# Patient Record
Sex: MALE | Race: WHITE | Employment: FULL TIME | URBAN - METROPOLITAN AREA
[De-identification: names, ages, dates, MRNs, and addresses within clinical notes are randomized per-mention and may not be internally consistent; named-entity substitution may affect disease eponyms.]

---

## 2018-01-08 ENCOUNTER — HOSPITAL ENCOUNTER (OUTPATIENT)
Dept: CT IMAGING | Age: 63
Discharge: HOME | End: 2018-01-08
Attending: INTERNAL MEDICINE | Admitting: INTERNAL MEDICINE

## 2018-01-08 VITALS
SYSTOLIC BLOOD PRESSURE: 110 MMHG | RESPIRATION RATE: 18 BRPM | DIASTOLIC BLOOD PRESSURE: 70 MMHG | OXYGEN SATURATION: 97 % | HEART RATE: 61 BPM

## 2018-01-08 DIAGNOSIS — R93.1 ABNORMAL SCREENING CARDIAC CT: ICD-10-CM

## 2018-01-08 DIAGNOSIS — E78.00 PURE HYPERCHOLESTEROLEMIA: ICD-10-CM

## 2018-01-08 NOTE — PROGRESS NOTES
Patient arrived alert and orientated x 4, ambulatory, denies pain. Initial HR   73, SR, 119/83 , 97% on RA. CTA Coronary study done without complication. Patient given IV Metoprolol 15 mg in three doses over 15 minutes. See flow sheets for VS.  Patient was taken to [facility] after study and recovered for 30 minutes.

## 2020-09-02 ENCOUNTER — OFFICE VISIT (OUTPATIENT)
Dept: ORTHOPEDIC SURGERY | Age: 65
End: 2020-09-02
Payer: COMMERCIAL

## 2020-09-02 VITALS — BODY MASS INDEX: 25.06 KG/M2 | WEIGHT: 185 LBS | TEMPERATURE: 97 F | HEIGHT: 72 IN

## 2020-09-02 PROCEDURE — 20610 DRAIN/INJ JOINT/BURSA W/O US: CPT | Performed by: ORTHOPAEDIC SURGERY

## 2020-09-02 PROCEDURE — 99213 OFFICE O/P EST LOW 20 MIN: CPT | Performed by: ORTHOPAEDIC SURGERY

## 2020-09-02 RX ORDER — ROPIVACAINE HYDROCHLORIDE 5 MG/ML
30 INJECTION, SOLUTION EPIDURAL; INFILTRATION; PERINEURAL ONCE
Status: COMPLETED | OUTPATIENT
Start: 2020-09-02 | End: 2020-09-02

## 2020-09-02 RX ORDER — METHYLPREDNISOLONE ACETATE 40 MG/ML
40 INJECTION, SUSPENSION INTRA-ARTICULAR; INTRALESIONAL; INTRAMUSCULAR; SOFT TISSUE ONCE
Status: COMPLETED | OUTPATIENT
Start: 2020-09-02 | End: 2020-09-02

## 2020-09-02 RX ADMIN — ROPIVACAINE HYDROCHLORIDE 30 ML: 5 INJECTION, SOLUTION EPIDURAL; INFILTRATION; PERINEURAL at 12:08

## 2020-09-02 RX ADMIN — METHYLPREDNISOLONE ACETATE 40 MG: 40 INJECTION, SUSPENSION INTRA-ARTICULAR; INTRALESIONAL; INTRAMUSCULAR; SOFT TISSUE at 12:08

## 2020-09-02 NOTE — PROGRESS NOTES
[address]  Office Visit  New Patient  Date:  2020    Name:  Tin Head  Address:  [address] [place] [address] [place] [address]    :  1955      Age:   63 y.o.    SSN:  [id number]      Medical Record Number:  <[id number]>    Chief Complaint:    Right shoulder pain    HPI:   Tin Head is a 63 y.o. male who presents for evaluation of right shoulder pain. Patient states he began having right shoulder pain roughly 3 weeks ago. Denies any injury prior to this. States he has been taking Advil for pain relief with minimal improvement. Feels pain is worse with any overhead activity and during his backswing when he attempts to swing a golf club. No radiation of pain down the arm or into the neck, no numbness or tingling. Locates majority the pain to the anterior lateral region. States he has had some difficulty sleeping. He denies any new numbness, tingling, fevers, chills, chest pain, shortness of breath, or any other new significant symptoms. Pain Assessment  Location of Pain: Shoulder  Location Modifiers: Right  Severity of Pain: 7  Quality of Pain: Throbbing, Aching, Grinding  Duration of Pain: Persistent  Frequency of Pain: Constant  Date Pain First Started: 20  Aggravating Factors: Bending, Stretching, Straightening  Limiting Behavior: Yes  Relieving Factors: Rest, Nsaids(ibuprofen)  Result of Injury: No  Work-Related Injury: No  Are there other pain locations you wish to document?: No    Past History:  No past medical history on file. No past surgical history on file. Social History     Tobacco Use    Smoking status: Never Smoker    Smokeless tobacco: Never Used   Substance Use Topics    Alcohol use: Not on file    Drug use: Not on file        Family History:  family history is not on file.          Current Outpatient Medications:     Multiple Vitamins-Minerals (CENTRUM SILVER) TABS, Take 1 tablet by mouth, Disp: , Rfl:     VASCEPA 1 g CAPS capsule, TAKE 2 CAPSULES BY MOUTH TWICE A DAY, Disp: , Rfl: 9      No Known Allergies      Review of Systems: A 14 point review of systems available in the scanned medical record as documented by the patient on 9/2/2020. Today's review pertinent items are noted in HPI. No notes on file    Physical Exam:  Temp 97 °F (36.1 °C)   Ht 6' (1.829 m)   Wt 185 lb (83.9 kg)   BMI 25.09 kg/m²         General: No acute distress, well nourished  CV: No obvious peripheral edema. Normal peripheral pulses  Neuro: Alert & oriented x 3  Psych: Normal mood and affect    Right shoulder exam    Patient has full forward flexion, abduction of the right shoulder compared with the left however does have pain at the endpoints. 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 male with:  1. Right rotator cuff tendinitis  2. Right biceps tendinitis    Impression:  Encounter Diagnoses   Name Primary?     Rotator cuff tendonitis, right     Biceps tendinitis of right shoulder     Right shoulder pain, unspecified chronicity Yes       Office Procedures:  Orders Placed This Encounter   Procedures    XR SHOULDER RIGHT (MIN 2 VIEWS)     Standing Status:   Future     Number of Occurrences:   1     Standing Expiration Date:   9/2/2021     Order Specific Question:   Reason for exam:     Answer:   Pain    [facility] PT - [facility]     Referral Priority:   Routine     Referral Type:   Eval and was physically present and performed my own examination of this patient and have discussed the case, including pertinent history and exam findings with the fellow. I agree with the documented assessment and plan. Juanpablo Bro MD

## 2020-09-09 ENCOUNTER — HOSPITAL ENCOUNTER (OUTPATIENT)
Dept: PHYSICAL THERAPY | Age: 65
Setting detail: THERAPIES SERIES
Discharge: HOME | End: 2020-09-09
Payer: COMMERCIAL

## 2020-09-09 PROCEDURE — 97110 THERAPEUTIC EXERCISES: CPT | Performed by: PHYSICAL THERAPIST

## 2020-09-09 PROCEDURE — 97161 PT EVAL LOW COMPLEX 20 MIN: CPT | Performed by: PHYSICAL THERAPIST

## 2020-09-09 NOTE — PLAN OF CARE
The [address] [place] [address] and [facility]      Physical Therapy Certification    Dear Referring Practitioner: Melissa Rojas MD,    We had the pleasure of evaluating the following patient for physical therapy services at [address] [place] [address]. A summary of our findings can be found in the initial assessment below. This includes our plan of care. If you have any questions or concerns regarding these findings, please do not hesitate to contact me at the office phone number checked above. Thank you for the referral.       Physician Signature:_______________________________Date:__________________  By signing above (or electronic signature), therapists plan is approved by physician      Patient: Dorine Burkitt   : 1955   MRN: [id number]  Referring Physician: Referring Practitioner: Melissa Rojas MD      Evaluation Date: 2020      Medical Diagnosis Information:  Diagnosis: Right shoulder RTC tendinitis/Bicep tendinitis   ICD10: M75.81   Treatment Diagnosis: increased pain; decreased strength                                         Insurance information: PT Insurance Information: [facility]    Precautions/ Contra-indications:     C-SSRS Triggered by Intake questionnaire (Past 2 wk assessment):   [x] No, Questionnaire did not trigger screening.   [] Yes, Patient intake triggered further evaluation      [] C-SSRS Screening completed  [] PCP notified via Plan of Care  [] Emergency services notified     Latex Allergy:  [x]NO      []YES  Preferred Language for Healthcare:   [x]English       []other:    SUBJECTIVE: Patient stated complaint:  Pt has had pain for several weeks. He is an avid golfer. Pt reports that the cortisone injection was helpful. He has pain at 90 degrees abduction. Pain was constant prior to the injection. Pt states he has joint noise, but it is not painful.  He takes Advil prn.     - night pain  - stiffness  - sensation changes  - meds  - ice/heat    Relevant Medical History: none  Functional Disability Index:  UEFI= 91%    Pain Scale: 5/10  Easing factors:   Provocative factors: reaching, lifting, OH movements    Type: [x]Constant   [x]Intermittent  []Radiating []Localized []other:     Numbness/Tingling: negative    Occupation/School: employed full duty    Living Status/Prior Level of Function: Independent with ADLs and IADLs    OBJECTIVE:     ROM PROM AROM  Comment    L R L R    Flexion        Abduction        ER  91      IR  61      Other  (cervical)        Other             Strength L R Comment   Flexion      Abduction      ER  4-/5 pain   IR  5/5    Supraspinatus      Upper Trap      Lower Trap      Mid Trap      Rhomboids      Biceps      Triceps      Horizontal Abduction      Horizontal Adduction      Lats        Special Tests Results/Comment   Margo    Neaki    Speeds    OBriens    Apprehension     Load & Shift         Reflexes/Sensation:               [x]Dermatomes/Myotomes intact               []Reflexes equal and normal bilaterally               []Other:     Joint mobility:               [x]Normal               []Hypo              []Hyper     Palpation: TTP at the bicep groove     Functional Mobility/Transfers: WNL     Posture: WNL     Bandages/Dressings/Incisions: NA     Gait: WNL     Orthopedic Special Tests: see above objective information                       [x] Patient history, allergies, meds reviewed. Medical chart reviewed. See intake form. Review Of Systems (ROS):  [x]Performed Review of systems (Integumentary, CardioPulmonary, Neurological) by intake and observation. Intake form has been scanned into medical record. Patient has been instructed to contact their primary care physician regarding ROS issues if not already being addressed at this time.        Co-morbidities/Complexities (which will affect course of rehabilitation):   [x]None              Arthritic conditions   []Rheumatoid arthritis (M05.9)  []Osteoarthritis (M19.91)    Cardiovascular conditions   []Hypertension (I10)  []Hyperlipidemia (E78.5)  []Angina pectoris (I20)  []Atherosclerosis (I70)    Musculoskeletal conditions   []Disc pathology   []Congenital spine pathologies   []Prior surgical intervention  []Osteoporosis (M81.8)  []Osteopenia (M85.8)   Endocrine conditions   []Hypothyroid (E03.9)  []Hyperthyroid Gastrointestinal conditions   []Constipation (B63.02)    Metabolic conditions   []Morbid obesity (E66.01)  []Diabetes type 1(E10.65) or 2 (E11.65)   []Neuropathy (G60.9)      Pulmonary conditions   []Asthma (J45)  []Coughing   []COPD (J44.9)    Psychological Disorders  []Anxiety (F41.9)  []Depression (F32.9)   []Other:    []Other:            Barriers to/and or personal factors that will affect rehab potential:              []Age  []Sex              []Motivation/Lack of Motivation                        []Co-Morbidities              []Cognitive Function, education/learning barriers              []Environmental, home barriers              []profession/work barriers  []past PT/medical experience  []other:  Justification: Pt has a good rehab potential.      Falls Risk Assessment (30 days):   [x] Falls Risk assessed and no intervention required.   [] Falls Risk assessed and Patient requires intervention due to being higher risk   TUG score (>12s at risk):     [] Falls education provided, including     ASSESSMENT:   Functional Impairments              []Noted spinal or UE joint hypomobility              []Noted spinal or UE joint hypermobility              []Decreased UE functional ROM              [x]Decreased UE functional strength              []Abnormal reflexes/sensation/myotomal/dermatomal deficits              [x]Decreased RC/scapular/core strength and neuromuscular control              []other:       Functional Activity Limitations (from functional questionnaire and intake)              [x]Reduced ability to tolerate prolonged functional positions              []Reduced ability or difficulty with changes of positions or transfers between positions              []Reduced ability to maintain good posture and demonstrate good body mechanics with sitting, bending, and lifting              [] Reduced ability or tolerance with driving and/or computer work              []Reduced ability to sleep              [x]Reduced ability to perform lifting, reaching, carrying tasks              []Reduced ability to tolerate impact through UE              []Reduced ability to reach behind back              []Reduced ability to  or hold objects              []Reduced ability to throw or toss an object              []other:       Participation Restrictions              []Reduced participation in self care activities              []Reduced participation in home management activities              []Reduced participation in work activities              [x]Reduced participation in social activities. [x]Reduced participation in sport / recreational activities. Classification:              []Signs/symptoms consistent with post-surgical status including decreased ROM, strength and function.   []Signs/symptoms consistent with joint sprain/strain              []Signs/symptoms consistent with shoulder impingement              [x]Signs/symptoms consistent with shoulder/elbow/wrist tendinopathy              []Signs/symptoms consistent with Rotator cuff tear              []Signs/symptoms consistent with labral tear              []Signs/symptoms consistent with postural dysfunction                         []Signs/symptoms consistent with Glenohumeral IR Deficit - <45 degrees              []Signs/symptoms consistent with facet dysfunction of cervical/thoracic spine                         []Signs/symptoms consistent with pathology which may benefit from Dry needling                []other:      Prognosis/Rehab Potential: protection, postural re-education, activity modification, progression of HEP. GOALS:   Patient stated goal: no pain with activity. [] Progressing: [] Met: [] Not Met: [] Adjusted    Therapist goals for Patient:   Short Term Goals: To be achieved in: 2 weeks  1. Independent in HEP and progression per patient tolerance, in order to prevent re-injury. [] Progressing: [] Met: [] Not Met: [] Adjusted   2. Patient will have a decrease in pain to facilitate improvement in movement, function, and ADLs as indicated by Functional Deficits. [] Progressing: [] Met: [] Not Met: [] Adjusted    Long Term Goals: To be achieved in: 8 weeks  1. Disability index score of 5% or less for the UEFS to assist with reaching prior level of function. [] Progressing: [] Met: [] Not Met: [] Adjusted  2. Patient will demonstrate increased AROM to WNL to allow for proper joint functioning as indicated by patients Functional Deficits. [] Progressing: [] Met: [] Not Met: [] Adjusted  3. Patient will demonstrate an increase in strength to good scapular and core control  for UE to allow for proper functional mobility as indicated by patients Functional Deficits. [] Progressing: [] Met: [] Not Met: [] Adjusted  4. Patient will return to  functional activities without increased symptoms or restriction. [] Progressing: [] Met: [] Not Met: [] Adjusted  5. Pt will return to playing golf with decreased pain. [] Progressing: [] Met: [] Not Met: [] Adjusted    Electronically signed by:  Jitendra Kitchen PT    Note: If patient does not return for scheduled/ recommended follow up visits, this note will serve as a discharge from care along with most recent update on progress.

## 2020-09-09 NOTE — FLOWSHEET NOTE
The [address] [facility] [address]    Physical Therapy Daily Treatment Note  Date:  2020    Patient Name:  Negrita Weinberg    :  1955  MRN: [id number]  Restrictions/Precautions:    Medical/Treatment Diagnosis Information:  · Diagnosis: Right shoulder RTC tendinitis/Bicep tendinitis    ·  ICD10: M75.81  · Treatment Diagnosis: increased pain; decreased strength  Insurance/Certification information:  PT Insurance Information: [facility]  Physician Information:  Referring Practitioner: Jude Zambrano MD  Has the plan of care been signed (Y/N):        []  Yes  [x]  No     Date of Patient follow up with Physician:       Is this a Progress Report:     []  Yes  [x]  No        If Yes:  Date Range for reporting period:  Beginning  Ending    Progress report will be due (10 Rx or 30 days whichever is less):       Recertification will be due (POC Duration  / 90 days whichever is less): 20        Visit # Insurance Allowable Auth Required   1 20 visits hard max []  Yes []  No        Functional Scale: UEFI= 91%   Date assessed: 20     Latex Allergy:  [x]NO      []YES  Preferred Language for Healthcare:   [x]English       []other:    Pain level:  5/10     SUBJECTIVE:  See eval    OBJECTIVE: See eval   Observation:    Test measurements:      RESTRICTIONS/PRECAUTIONS:     Exercises/Interventions:   Exercises:  Exercise/Equipment Resistance/Repetitions Other comments   Stretching/PROM     Wand     Table Slides     UE [place]     Pulleys     Pendulum          Isometrics     Retraction          Weight shift     Flexion     Abduction     External Rotation     Internal Rotation     Biceps     Triceps          PRE's     Flexion     Abduction     External Rotation 2# 3x10    Internal Rotation 5# 3x10    Shrugs     EXT 2# 3x10    Reverse Flys     Serratus 0# 3x10    Horizontal Abd with ER 0# 3x10    Biceps     Triceps     Retraction 5# 3x10         Cable Column/Theraband External Rotation     Internal Rotation     Shrugs     Lats     Ext     Flex     Scapular Retraction     BIC     TRIC     PNF          Dynamic Stability          Plyoback          Manual interventions                 Patient Education:  Access Code: [email or web address]   URL: [email or web address]   Date: 09/09/2020   Prepared by: Vianney Martin     Exercises  Sidelying Shoulder External Rotation - 10 reps - 3 sets - 1x daily - 7x weekly  Supine Scapular Protraction in Flexion with Dumbbells - 10 reps - 3 sets - 1x daily - 7x weekly  Prone Shoulder Horizontal Abduction - 10 reps - 3 sets - 1x daily - 7x weekly  Prone Shoulder Extension - Single Arm - 10 reps - 3 sets - 1x daily - 7x weekly  Prone Shoulder Row - 10 reps - 3 sets - 1x daily - 7x weekly  Sidelying Shoulder Internal Rotation with Dumbbell - 10 reps - 3 sets - 1x daily - 7x weekly    Therapeutic Exercise and NMR EXR  [x] (77375) Provided verbal/tactile cueing for activities related to strengthening, flexibility, endurance, ROM  for improvements in scapular, scapulothoracic and UE control with self care, reaching, carrying, lifting, house/yardwork, driving/computer work.    [] (73527) Provided verbal/tactile cueing for activities related to improving balance, coordination, kinesthetic sense, posture, motor skill, proprioception  to assist with  scapular, scapulothoracic and UE control with self care, reaching, carrying, lifting, house/yardwork, driving/computer work. Therapeutic Activities:    [] (89528 or 11126) Provided verbal/tactile cueing for activities related to improving balance, coordination, kinesthetic sense, posture, motor skill, proprioception and motor activation to allow for proper function of scapular, scapulothoracic and UE control with self care, carrying, lifting, driving/computer work.      Home Exercise Program:    [x] (77915) Reviewed/Progressed HEP activities related to strengthening, flexibility, endurance, ROM of scapular, weeks  1. Disability index score of 5% or less for the UEFS to assist with reaching prior level of function. []? Progressing: []? Met: []? Not Met: []? Adjusted  2. Patient will demonstrate increased AROM to WNL to allow for proper joint functioning as indicated by patients Functional Deficits. []? Progressing: []? Met: []? Not Met: []? Adjusted  3. Patient will demonstrate an increase in strength to good scapular and core control  for UE to allow for proper functional mobility as indicated by patients Functional Deficits. []? Progressing: []? Met: []? Not Met: []? Adjusted  4. Patient will return to  functional activities without increased symptoms or restriction. []? Progressing: []? Met: []? Not Met: []? Adjusted  5. Pt will return to playing golf with decreased pain. []? Progressing: []? Met: []? Not Met: []? Adjusted    Overall Progression Towards Functional goals/ Treatment Progress Update:  [] Patient is progressing as expected towards functional goals listed. [] Progression is slowed due to complexities/Impairments listed. [] Progression has been slowed due to co-morbidities. [x] Plan just implemented, too soon to assess goals progression <30days   [] Goals require adjustment due to lack of progress  [] Patient is not progressing as expected and requires additional follow up with physician  [] Other    Prognosis for POC: [x] Good [] Fair  [] Poor      Patient requires continued skilled intervention: [x] Yes  [] No    Treatment/Activity Tolerance:  [] Patient able to complete treatment  [x] Patient limited by fatigue  [x] Patient limited by pain     [] Patient limited by other medical complications  [x] Other: Pt presents with increased pain and decreased RTC strength secondary to RTC tendinitis. He has good ROM. Pain with activity and golf.  Pt will benefit from a strengthening program.     PLAN: See eval  [] Continue per plan of care [] Alter current plan (see comments above)  [x] Plan of care initiated [] Hold pending MD visit [] Discharge      Electronically signed by:  Sheila Bledsoe, PT    Note: If patient does not return for scheduled/ recommended follow up visits, this note will serve as a discharge from care along with most recent update on progress.

## 2020-09-16 ENCOUNTER — HOSPITAL ENCOUNTER (OUTPATIENT)
Dept: PHYSICAL THERAPY | Age: 65
Setting detail: THERAPIES SERIES
Discharge: HOME | End: 2020-09-16
Payer: COMMERCIAL

## 2020-09-16 PROCEDURE — 97110 THERAPEUTIC EXERCISES: CPT | Performed by: PHYSICAL THERAPIST

## 2020-09-16 PROCEDURE — 97530 THERAPEUTIC ACTIVITIES: CPT | Performed by: PHYSICAL THERAPIST

## 2020-09-16 NOTE — FLOWSHEET NOTE
[facility] [address] [place] [address] [facility]    [facility] Therapy Daily Treatment Note  Date:  2020    Patient Name:  Alicia Almeida    :  1955  MRN: [id number]  Restrictions/Precautions:    Medical/Treatment Diagnosis Information:  · Diagnosis: Right shoulder RTC tendinitis/Bicep tendinitis    ·  ICD10: M75.81  · Treatment Diagnosis: increased pain; decreased strength  Insurance/Certification information:  PT Insurance Information: [employer]  Physician Information:  Referring Practitioner: Charley Burleson MD  Has the plan of care been signed (Y/N):        []  Yes  [x]  No     Date of Patient follow up with Physician:       Is this a Progress Report:     []  Yes  [x]  No        If Yes:  Date Range for reporting period:  Beginning  Ending    Progress report will be due (10 Rx or 30 days whichever is less):       Recertification will be due (POC Duration  / 90 days whichever is less): 20        Visit # Insurance Allowable Auth Required   2 20 visits hard max []  Yes []  No        Functional Scale: UEFI= 91%   Date assessed: 20     Latex Allergy:  [x]NO      []YES  Preferred Language for Healthcare:   [x]English       []other:    Pain level:  3/10; 7/10 with activity     SUBJECTIVE:  +HEP. Pt has pain with abduction at 90 degrees. He is feeling better overall. Pt is able to golf and work in the garden. Pt notes joint crepitus with rotation motions.      OBJECTIVE:    Observation:    Test measurements:      RESTRICTIONS/PRECAUTIONS:     Exercises/Interventions: Right shoulder  Exercises:  Exercise/Equipment Resistance/Repetitions Other comments   Stretching/PROM     Wand     Table Slides     UE Troy     Pulleys     Pendulum          Isometrics     Retraction          Weight shift     Flexion     Abduction     External Rotation     Internal Rotation     Biceps     Triceps          PRE's     Flexion     Abduction     External Rotation 3# 3x10    Internal Rotation 10# 3x10    Shrugs Next visit    EXT 3# 3x10    Reverse Flys     Serratus 5# 3x10    Horizontal Abd with ER 2# 3x10    Biceps 7# 3x10    Triceps     Retraction 10# 3x10         Cable Column/Theraband     External Rotation     Internal Rotation     Shrugs     Lats     Ext     Flex     Scapular Retraction HEP bands CC next visit   BIC     TRIC     PNF          Dynamic Stability     Ball on wall 3x10 CW/CCW FWD   Plyoback          Manual interventions                 Patient Education:  Access Code: [email or web address]   URL: [employer]/   Date: 09/16/2020   Prepared by: Jitendra Manifold     Exercises  Sidelying Shoulder External Rotation - 10 reps - 3 sets - 1x daily - 7x weekly  Supine Scapular Protraction in Flexion with Dumbbells - 10 reps - 3 sets - 1x daily - 7x weekly  Prone Shoulder Horizontal Abduction - 10 reps - 3 sets - 1x daily - 7x weekly  Prone Shoulder Extension - Single Arm - 10 reps - 3 sets - 1x daily - 7x weekly  Sidelying Shoulder Internal Rotation with Dumbbell - 10 reps - 3 sets - 1x daily - 7x weekly  Standing Bent Over Single Arm Scapular Row with Table Support - 10 reps - 3 sets - 1x daily - 7x weekly  Standing Wall Ball Circles with Mini Swiss Ball - 10 reps - 3 sets - 1x daily - 7x weekly  Standing Row with Anchored Resistance - 10 reps - 3 sets - 1x daily - 7x weekly  Standing Bicep Curls Supinated with Dumbbells - 10 reps - 3 sets - 1x daily - 7x weekly    Therapeutic Exercise and NMR EXR  [x] (35145) Provided verbal/tactile cueing for activities related to strengthening, flexibility, endurance, ROM  for improvements in scapular, scapulothoracic and UE control with self care, reaching, carrying, lifting, house/yardwork, driving/computer work.    [] (30879) Provided verbal/tactile cueing for activities related to improving balance, coordination, kinesthetic sense, posture, motor skill, proprioception  to assist with  scapular, scapulothoracic and UE control with self care, reaching, carrying, lifting, house/yardwork, driving/computer work. Therapeutic Activities:    [x] (43159 or 68450) Provided verbal/tactile cueing for activities related to improving balance, coordination, kinesthetic sense, posture, motor skill, proprioception and motor activation to allow for proper function of scapular, scapulothoracic and UE control with self care, carrying, lifting, driving/computer work.      Home Exercise Program:    [x] (93994) Reviewed/Progressed HEP activities related to strengthening, flexibility, endurance, ROM of scapular, scapulothoracic and UE control with self care, reaching, carrying, lifting, house/yardwork, driving/computer work  [x] (61091) Reviewed/Progressed HEP activities related to improving balance, coordination, kinesthetic sense, posture, motor skill, proprioception of scapular, scapulothoracic and UE control with self care, reaching, carrying, lifting, house/yardwork, driving/computer work      Manual Treatments:  PROM / STM / Oscillations-Mobs:  G-I, II, III, IV (PA's, Inf., Post.)  [] (61233) Provided manual therapy to mobilize soft tissue/joints of cervical/CT, scapular GHJ and UE for the purpose of modulating pain, promoting relaxation,  increasing ROM, reducing/eliminating soft tissue swelling/inflammation/restriction, improving soft tissue extensibility and allowing for proper ROM for normal function with self care, reaching, carrying, lifting, house/yardwork, driving/computer work    Modalities:      Charges:  Timed Code Treatment Minutes: 40   Total Treatment Minutes: 40       [] EVAL (LOW) 23049 (typically 20 minutes face-to-face)  [] EVAL (MOD) 14785 (typically 30 minutes face-to-face)  [] EVAL (HIGH) 57863 (typically 45 minutes face-to-face)  [] RE-EVAL     [x] AN(30843) x 2    [] IONTO  [] NMR (56991) x     [] VASO  [] Manual (40128) x      [] Other:  [x] TA x  1    [] Mech Traction (48597)  [] ES(attended) (56465)      [] ES (un) (30147):        GOALS: Patient stated goal: no pain with activity. []? Progressing: []? Met: []? Not Met: []? Adjusted     Therapist goals for Patient:   Short Term Goals: To be achieved in: 2 weeks  1. Independent in HEP and progression per patient tolerance, in order to prevent re-injury. []? Progressing: []? Met: []? Not Met: []? Adjusted   2. Patient will have a decrease in pain to facilitate improvement in movement, function, and ADLs as indicated by Functional Deficits. []? Progressing: []? Met: []? Not Met: []? Adjusted     Long Term Goals: To be achieved in: 8 weeks  1. Disability index score of 5% or less for the UEFS to assist with reaching prior level of function. []? Progressing: []? Met: []? Not Met: []? Adjusted  2. Patient will demonstrate increased AROM to WNL to allow for proper joint functioning as indicated by patients Functional Deficits. []? Progressing: []? Met: []? Not Met: []? Adjusted  3. Patient will demonstrate an increase in strength to good scapular and core control  for UE to allow for proper functional mobility as indicated by patients Functional Deficits. []? Progressing: []? Met: []? Not Met: []? Adjusted  4. Patient will return to  functional activities without increased symptoms or restriction. []? Progressing: []? Met: []? Not Met: []? Adjusted  5. Pt will return to playing golf with decreased pain. []? Progressing: []? Met: []? Not Met: []? Adjusted    Overall Progression Towards Functional goals/ Treatment Progress Update:  [] Patient is progressing as expected towards functional goals listed. [] Progression is slowed due to complexities/Impairments listed. [] Progression has been slowed due to co-morbidities.   [x] Plan just implemented, too soon to assess goals progression <30days   [] Goals require adjustment due to lack of progress  [] Patient is not progressing as expected and requires additional follow up with physician  [] Other    Prognosis for POC: [x] Good [] Fair  [] Poor      Patient requires continued skilled intervention: [x] Yes  [] No    Treatment/Activity Tolerance:  [] Patient able to complete treatment  [x] Patient limited by fatigue  [x] Patient limited by pain     [] Patient limited by other medical complications  [x] Other: Pt has a good understanding of the exercises. He was able to increase weights today. ER exercise and prone HABDER are difficult. Pt has pain at 90 degrees of abduction during arm elevation. PLAN: Strengthening program.   [x] Continue per plan of care [] Alter current plan (see comments above)  [] Plan of care initiated [] Hold pending MD visit [] Discharge      Electronically signed by:  Ling Shepard PT    Note: If patient does not return for scheduled/ recommended follow up visits, this note will serve as a discharge from care along with most recent update on progress.

## 2020-09-22 ENCOUNTER — APPOINTMENT (OUTPATIENT)
Dept: PHYSICAL THERAPY | Age: 65
End: 2020-09-22
Payer: COMMERCIAL

## 2020-09-23 ENCOUNTER — HOSPITAL ENCOUNTER (OUTPATIENT)
Dept: PHYSICAL THERAPY | Age: 65
Setting detail: THERAPIES SERIES
Discharge: HOME | End: 2020-09-23
Payer: COMMERCIAL

## 2020-09-23 PROCEDURE — 97530 THERAPEUTIC ACTIVITIES: CPT | Performed by: PHYSICAL THERAPIST

## 2020-09-23 PROCEDURE — 97110 THERAPEUTIC EXERCISES: CPT | Performed by: PHYSICAL THERAPIST

## 2020-09-23 NOTE — FLOWSHEET NOTE
Internal Rotation 10# 3x10/15    Shrugs 10# 3x10    EXT 3# 3x10/15    Reverse Flys     Serratus  Pain and click with weight   Horizontal Abd with ER 2# 3x10/15    Biceps 10# 3x10    Triceps     Retraction 10# 3x10/15         Cable Column/Theraband     External Rotation Blue step away [id number]    Internal Rotation     Shrugs     Lats     Ext     Flex     Scapular Retraction HEP bands CC plate [id number]   BIC     TRIC     PNF          Dynamic Stability     Ball on wall 2# ball  3x10 CW/CCW FWD/ABD   Plyoback          Manual interventions                 Patient Education:  Access Code: [email or web address]   URL: [employer]/   Date: 09/23/2020   Prepared by: Julissa Ho     Exercises  Sidelying Shoulder External Rotation - 10-15 reps - 3 sets - 1x daily - 7x weekly  Prone Shoulder Horizontal Abduction - 10-15 reps - 3 sets - 1x daily - 7x weekly  Prone Shoulder Extension - Single Arm - 10-15 reps - 3 sets - 1x daily - 7x weekly  Sidelying Shoulder Internal Rotation with Dumbbell - 10-15 reps - 3 sets - 1x daily - 7x weekly  Standing Bent Over Single Arm Scapular Row with Table Support - 10-15 reps - 3 sets - 1x daily - 7x weekly  Standing Wall Ball Circles with Mini Swiss Ball - 10 reps - 3 sets - 1x daily - 7x weekly  Standing Row with Anchored Resistance - 10-15 reps - 3 sets - 1x daily - 7x weekly  Standing Bicep Curls Supinated with Dumbbells - 10 reps - 3 sets - 1x daily - 7x weekly  Standing Shoulder Shrugs with Dumbbells - 10-15 reps - 3 sets - 1x daily - 7x weekly  Shoulder External Rotation Reactive Isometrics - 10 reps - 1 sets - 10sec hold - 1x daily - 7x weekly  Standing Wall Ball Circles in Scaption with Mini Swiss Ball - 10 reps - 3 sets - 1x daily - 7x weekly    Therapeutic Exercise and NMR EXR  [x] (41896) Provided verbal/tactile cueing for activities related to strengthening, flexibility, endurance, ROM  for improvements in scapular, scapulothoracic and UE control with self care, reaching, carrying, lifting, house/yardwork, driving/computer work.    [] (80212) Provided verbal/tactile cueing for activities related to improving balance, coordination, kinesthetic sense, posture, motor skill, proprioception  to assist with  scapular, scapulothoracic and UE control with self care, reaching, carrying, lifting, house/yardwork, driving/computer work. Therapeutic Activities:    [x] (27191 or 22680) Provided verbal/tactile cueing for activities related to improving balance, coordination, kinesthetic sense, posture, motor skill, proprioception and motor activation to allow for proper function of scapular, scapulothoracic and UE control with self care, carrying, lifting, driving/computer work.      Home Exercise Program:    [x] (46313) Reviewed/Progressed HEP activities related to strengthening, flexibility, endurance, ROM of scapular, scapulothoracic and UE control with self care, reaching, carrying, lifting, house/yardwork, driving/computer work  [x] (69805) Reviewed/Progressed HEP activities related to improving balance, coordination, kinesthetic sense, posture, motor skill, proprioception of scapular, scapulothoracic and UE control with self care, reaching, carrying, lifting, house/yardwork, driving/computer work      Manual Treatments:  PROM / STM / Oscillations-Mobs:  G-I, II, III, IV (PA's, Inf., Post.)  [] (21274) Provided manual therapy to mobilize soft tissue/joints of cervical/CT, scapular GHJ and UE for the purpose of modulating pain, promoting relaxation,  increasing ROM, reducing/eliminating soft tissue swelling/inflammation/restriction, improving soft tissue extensibility and allowing for proper ROM for normal function with self care, reaching, carrying, lifting, house/yardwork, driving/computer work    Modalities:      Charges:  Timed Code Treatment Minutes: 50   Total Treatment Minutes: 50       [] EVAL (LOW) 57882 (typically 20 minutes face-to-face)  [] EVAL (MOD) 46118 (typically 30 minutes due to co-morbidities. [x] Plan just implemented, too soon to assess goals progression <30days   [] Goals require adjustment due to lack of progress  [] Patient is not progressing as expected and requires additional follow up with physician  [] Other    Prognosis for POC: [x] Good [] Fair  [] Poor      Patient requires continued skilled intervention: [x] Yes  [] No    Treatment/Activity Tolerance:  [] Patient able to complete treatment  [x] Patient limited by fatigue  [x] Patient limited by pain     [] Patient limited by other medical complications  [x] Other: Pt continues to have pain in an abducted position. We discussed increasing reps vs increasing weights and when that is an appropriate choice. Pt appears to understand this concept. Advanced ER isometric band exercise. Initiated scapular retraction with the bands today. Pt has clicking and discomfort with serratus punch exercise--hold this exercise today. PLAN: Strengthening program.   [x] Continue per plan of care [] Alter current plan (see comments above)  [] Plan of care initiated [] Hold pending MD visit [] Discharge      Electronically signed by:  Magdi Moss PT    Note: If patient does not return for scheduled/ recommended follow up visits, this note will serve as a discharge from care along with most recent update on progress.

## 2020-10-02 ENCOUNTER — OFFICE VISIT (OUTPATIENT)
Dept: ORTHOPEDIC SURGERY | Age: 65
End: 2020-10-02
Payer: COMMERCIAL

## 2020-10-02 VITALS — WEIGHT: 185 LBS | BODY MASS INDEX: 25.06 KG/M2 | HEIGHT: 72 IN | TEMPERATURE: 98.6 F

## 2020-10-02 PROCEDURE — 3017F COLORECTAL CA SCREEN DOC REV: CPT | Performed by: ORTHOPAEDIC SURGERY

## 2020-10-02 PROCEDURE — G8419 CALC BMI OUT NRM PARAM NOF/U: HCPCS | Performed by: ORTHOPAEDIC SURGERY

## 2020-10-02 PROCEDURE — 99212 OFFICE O/P EST SF 10 MIN: CPT | Performed by: ORTHOPAEDIC SURGERY

## 2020-10-02 PROCEDURE — 1036F TOBACCO NON-USER: CPT | Performed by: ORTHOPAEDIC SURGERY

## 2020-10-02 PROCEDURE — G8427 DOCREV CUR MEDS BY ELIG CLIN: HCPCS | Performed by: ORTHOPAEDIC SURGERY

## 2020-10-02 PROCEDURE — G8484 FLU IMMUNIZE NO ADMIN: HCPCS | Performed by: ORTHOPAEDIC SURGERY

## 2020-10-02 NOTE — PROGRESS NOTES
The patient returns today for follow-up of right rotator cuff tendinitis. He is been in therapy about a month he had an subacromial injection which helped quite a bit. He feels he is about 50% better. ROS: Pertinent items are noted in HPI. No notes on file    History reviewed. No pertinent past medical history. History reviewed. No pertinent surgical history. History reviewed. No pertinent family history. Social History    Socioeconomic History      Marital status:       Spouse name: None      Number of children: None      Years of education: None      Highest education level: None    Occupational History      None    Social Needs      Financial resource strain: None      Food insecurity        Worry: None        Inability: None      Transportation needs        Medical: None        Non-medical: None    Tobacco Use      Smoking status: Never Smoker      Smokeless tobacco: Never Used    Substance and Sexual Activity      Alcohol use: None      Drug use: None      Sexual activity: None    Lifestyle      Physical activity        Days per week: None        Minutes per session: None      Stress: None    Relationships      Social connections        Talks on phone: None        Gets together: None        Attends Sabian[employer] service: None        Active member of club or organization: None        Attends meetings of clubs or organizations: None        Relationship status: None      Intimate partner violence        Fear of current or ex partner: None        Emotionally abused: None        Physically abused: None        Forced sexual activity: None    Other Topics      Concerns:        None    Social History Narrative      None      Current Outpatient Medications:  Multiple Vitamins-Minerals (CENTRUM SILVER) TABS, Take 1 tablet by mouth, Disp: , Rfl:   VASCEPA 1 g CAPS capsule, TAKE 2 CAPSULES BY MOUTH TWICE A DAY, Disp: , Rfl: 9    No current facility-administered medications for this visit.        No

## 2020-11-06 ENCOUNTER — OFFICE VISIT (OUTPATIENT)
Dept: ORTHOPEDIC SURGERY | Age: 65
End: 2020-11-06
Payer: COMMERCIAL

## 2020-11-06 VITALS — BODY MASS INDEX: 25.06 KG/M2 | WEIGHT: 185 LBS | TEMPERATURE: 97.1 F | HEIGHT: 72 IN

## 2020-11-06 PROCEDURE — G8419 CALC BMI OUT NRM PARAM NOF/U: HCPCS | Performed by: ORTHOPAEDIC SURGERY

## 2020-11-06 PROCEDURE — 1036F TOBACCO NON-USER: CPT | Performed by: ORTHOPAEDIC SURGERY

## 2020-11-06 PROCEDURE — G8427 DOCREV CUR MEDS BY ELIG CLIN: HCPCS | Performed by: ORTHOPAEDIC SURGERY

## 2020-11-06 PROCEDURE — G8484 FLU IMMUNIZE NO ADMIN: HCPCS | Performed by: ORTHOPAEDIC SURGERY

## 2020-11-06 PROCEDURE — 3017F COLORECTAL CA SCREEN DOC REV: CPT | Performed by: ORTHOPAEDIC SURGERY

## 2020-11-06 PROCEDURE — 20610 DRAIN/INJ JOINT/BURSA W/O US: CPT | Performed by: ORTHOPAEDIC SURGERY

## 2020-11-06 PROCEDURE — 99213 OFFICE O/P EST LOW 20 MIN: CPT | Performed by: ORTHOPAEDIC SURGERY

## 2020-11-06 RX ORDER — METHYLPREDNISOLONE ACETATE 40 MG/ML
40 INJECTION, SUSPENSION INTRA-ARTICULAR; INTRALESIONAL; INTRAMUSCULAR; SOFT TISSUE ONCE
Status: COMPLETED | OUTPATIENT
Start: 2020-11-06 | End: 2020-11-06

## 2020-11-06 RX ORDER — ROPIVACAINE HYDROCHLORIDE 5 MG/ML
30 INJECTION, SOLUTION EPIDURAL; INFILTRATION; PERINEURAL ONCE
Status: COMPLETED | OUTPATIENT
Start: 2020-11-06 | End: 2020-11-06

## 2020-11-06 RX ADMIN — ROPIVACAINE HYDROCHLORIDE 30 ML: 5 INJECTION, SOLUTION EPIDURAL; INFILTRATION; PERINEURAL at 12:01

## 2020-11-06 RX ADMIN — METHYLPREDNISOLONE ACETATE 40 MG: 40 INJECTION, SUSPENSION INTRA-ARTICULAR; INTRALESIONAL; INTRAMUSCULAR; SOFT TISSUE at 12:01

## 2020-11-06 NOTE — PROGRESS NOTES
History of Present Illness:  Tano Leach is a 63 y.o. male here for follow-up of his right shoulder. Patient is now 2 months status post right cortisone injection in the subacromial space and AC joint. He notes 50% improvement in his shoulder pain with home exercises and a cortisone injection. Unfortunately, he has plateaued with his progression and continues to have occasional twinges in the shoulder and pain while sleeping at night. He is requesting another cortisone injection today. Medical History:  Patient's medications, allergies, past medical, surgical, social and family histories were reviewed and updated as appropriate. Pertinent items are noted in HPI  Review of systems reviewed from Patient History Form dated on 11/6/2020 and available in the patient's chart under the Media tab. Vital Signs:  Vitals:    11/06/20 0937   Temp: 97.1 °F (36.2 °C)         Neuro: Alert & oriented x 3,  normal,  no focal deficits noted. Normal affect. Eyes: sclera clear  Ears: Normal external ear  Mouth:  No perioral lesions  Pulm: Respirations unlabored and regular  Pulse: Regular rate and rhythm   Skin: Warm, well perfused      Constitutional: The physical examination finds the patient to be well-developed and well-nourished. The patient is alert and oriented x3 and was cooperative throughout the visit. Right shoulder exam    Inspection:  Held in a normal posture. Normal contour at the acromioclavicular joint. No swelling, ecchymosis, or erythema about the shoulder. No atrophy appreciated. No scapular winging. Palpation: Tenderness to palpation in the bicipital groove and AC joint. Range of Motion: Full passive and active ROM with some pain in terminal abduction and forward flexion. Normal scapulothoracic rhythm. Strength:  Normal supraspinatus, infraspinatus, and subscapularis muscle strength. Stability: No anterior instability. No posterior instability.     Special Tests: Impingement findings are positive. Labral findings are negative. Speed sign and Yergason signs are both negative. Crossover sign is negative. Belly press sign is negative. Lift off sign is negative. Other findings: The skin is warm dry and well perfused. 2+ radial pulse. Sensation is intact to light touch over the deltoid. Left comparison shoulder exam    Inspection:  Held in a normal posture. Normal contour at the acromioclavicular joint. No swelling, ecchymosis, or erythema about the shoulder. No atrophy appreciated. No scapular winging. Palpation:  No subacromial crepitus. No tenderness of the AC joint. No greater tuberosity tenderness. No tenderness in the bicipital groove. Range of Motion: Full passive and active ROM. Normal scapulothoracic rhythm. Strength:  Normal supraspinatus, infraspinatus, and subscapularis muscle strength. Stability: No anterior instability. No posterior instability. Special Tests: Impingement findings are negative. Labral findings are negative. Speed sign and Yergason signs are both negative. Crossover sign is negative. Belly press sign is negative. Lift off sign is negative. Other findings: The skin is warm dry and well perfused. 2+ radial pulse. Sensation is intact to light touch over the deltoid. Radiology:     No new imaging today         Assessment :  63 y.o. male with right rotator cuff tendinitis and AC joint arthritis    Impression:  No diagnosis found. Office Procedures:  No orders of the defined types were placed in this encounter. Plan: We are encouraged by Brayan's progress so far with physical therapy. Unfortunately he is only had 2 formal physical therapy visits after which he went to a home exercise based program.  He has been using mostly Thera-Band for his strengthening program.  At this point we feel he would be better served by using weights at home and slowly increasing his weight as the shoulder tolerates.   Thera-Band's do not offer the resistance necessary to build strength. Crystal Perkins has requested another injection today and we feel that this is appropriate. The right shoulder was prepped and sterilized after which 2 cc of Depo-Medrol and 3 cc of ropivacaine was used to inject the subacromial space and AC joint. The patient tolerated the procedure well. Morena Caves is in agreement with this plan. All questions were answered to patient's satisfaction and was encouraged to call with any further questions. We will see him back in 1 month    [facility], [address] [facility]     11/06/20  10:08 AM Attestation:  I was physically present and performed my own examination of this patient and have discussed the case, including pertinent history and exam findings with the fellow. I agree with the documented assessment and plan. Yasmani Glez.  Tammie Figueroa MD

## 2020-12-04 ENCOUNTER — TELEPHONE (OUTPATIENT)
Dept: ORTHOPEDIC SURGERY | Age: 65
End: 2020-12-04

## 2020-12-04 ENCOUNTER — OFFICE VISIT (OUTPATIENT)
Dept: ORTHOPEDIC SURGERY | Age: 65
End: 2020-12-04
Payer: COMMERCIAL

## 2020-12-04 VITALS — HEIGHT: 72 IN | TEMPERATURE: 98.2 F | WEIGHT: 185 LBS | BODY MASS INDEX: 25.06 KG/M2

## 2020-12-04 PROCEDURE — 3017F COLORECTAL CA SCREEN DOC REV: CPT | Performed by: ORTHOPAEDIC SURGERY

## 2020-12-04 PROCEDURE — G8419 CALC BMI OUT NRM PARAM NOF/U: HCPCS | Performed by: ORTHOPAEDIC SURGERY

## 2020-12-04 PROCEDURE — 99213 OFFICE O/P EST LOW 20 MIN: CPT | Performed by: ORTHOPAEDIC SURGERY

## 2020-12-04 PROCEDURE — G8484 FLU IMMUNIZE NO ADMIN: HCPCS | Performed by: ORTHOPAEDIC SURGERY

## 2020-12-04 PROCEDURE — 1036F TOBACCO NON-USER: CPT | Performed by: ORTHOPAEDIC SURGERY

## 2020-12-04 PROCEDURE — G8427 DOCREV CUR MEDS BY ELIG CLIN: HCPCS | Performed by: ORTHOPAEDIC SURGERY

## 2020-12-04 NOTE — TELEPHONE ENCOUNTER
General Question     Subject: He would like his MRI sent to [facility]    Patient and /or Facility Request: Patient    Contact Number: [phone]

## 2020-12-04 NOTE — PROGRESS NOTES
History of Present Illness:  Regine Gonzalez is a 63 y.o. male here for follow-up of the right shoulder. Patient has had pain in the right shoulder for the last 3 months. He has had 2 cortisone injections both split into the [facility] joint and subacromial space. He has had about 50% improvement in his pain. He is also been going to physical therapy for rotator cuff and periscapular muscle strengthening exercises and has had some improvement as well. Unfortunately he is still having quite a bit of pain especially while sleeping at night. He is unable to lift any objects of substance away from his body without aggravating his right shoulder. Medical History:  Patient's medications, allergies, past medical, surgical, social and family histories were reviewed and updated as appropriate. Pertinent items are noted in HPI  Review of systems reviewed from Patient History Form dated on 12/4/2020 and available in the patient's chart under the Media tab. Vital Signs:  Vitals:    12/04/20 1104   Temp: 98.2 °F (36.8 °C)         Neuro: Alert & oriented x 3,  normal,  no focal deficits noted. Normal affect. Eyes: sclera clear  Ears: Normal external ear  Mouth:  No perioral lesions  Pulm: Respirations unlabored and regular  Pulse: Regular rate and rhythm   Skin: Warm, well perfused      Constitutional: The physical examination finds the patient to be well-developed and well-nourished. The patient is alert and oriented x3 and was cooperative throughout the visit. Right shoulder exam    Inspection:  Held in a normal posture. Normal contour at the acromioclavicular joint. No swelling, ecchymosis, or erythema about the shoulder. No atrophy appreciated. No scapular winging. Palpation: Tenderness to palpation over the greater tuberosity, no subacromial crepitus. No tenderness of the AC joint. No tenderness in the bicipital groove. Range of Motion: Full passive and active ROM.  Normal scapulothoracic rhythm. Strength: Pain with resisted abduction and forward flexion of the shoulder. Stability: No anterior instability. No posterior instability. Special Tests: Impingement findings are negative. Labral findings are negative. Speed sign and Yergason signs are both negative. Crossover sign is negative. Belly press sign is negative. Lift off sign is negative. Other findings: The skin is warm dry and well perfused. 2+ radial pulse. Sensation is intact to light touch over the deltoid. Radiology:     No new imaging today    Assessment :  63 y.o. male with right rotator cuff tendinitis and subacromial impingement    Impression:  No diagnosis found. Office Procedures:  No orders of the defined types were placed in this encounter. Plan: We had a good discussion with David Massey today regarding his right shoulder pain. At this point he has failed conservative measures including oral anti-inflammatories, physical therapy and cortisone injections. At this point we feel beneficial to get an MRI of the right shoulder to further assess his internal derangement. We will see him back following the MRI to review the results. Darnell Galeana is in agreement with this plan. All questions were answered to patient's satisfaction and was encouraged to call with any further questions. Kirstin Peterson DO  Clinical Fellow  [address] [place] [address]     12/04/20  11:39 AM Attestation:  I was physically present and performed my own examination of this patient and have discussed the case, including pertinent history and exam findings with the fellow. I agree with the documented assessment and plan. Jessica King.  Galina Mooney MD

## 2020-12-11 ENCOUNTER — OFFICE VISIT (OUTPATIENT)
Dept: ORTHOPEDIC SURGERY | Age: 65
End: 2020-12-11
Payer: COMMERCIAL

## 2020-12-11 ENCOUNTER — TELEPHONE (OUTPATIENT)
Dept: ORTHOPEDIC SURGERY | Age: 65
End: 2020-12-11

## 2020-12-11 VITALS — WEIGHT: 185 LBS | BODY MASS INDEX: 25.06 KG/M2 | TEMPERATURE: 97.2 F | HEIGHT: 72 IN

## 2020-12-11 PROCEDURE — 3017F COLORECTAL CA SCREEN DOC REV: CPT | Performed by: ORTHOPAEDIC SURGERY

## 2020-12-11 PROCEDURE — G8427 DOCREV CUR MEDS BY ELIG CLIN: HCPCS | Performed by: ORTHOPAEDIC SURGERY

## 2020-12-11 PROCEDURE — G8419 CALC BMI OUT NRM PARAM NOF/U: HCPCS | Performed by: ORTHOPAEDIC SURGERY

## 2020-12-11 PROCEDURE — 99213 OFFICE O/P EST LOW 20 MIN: CPT | Performed by: ORTHOPAEDIC SURGERY

## 2020-12-11 PROCEDURE — G8484 FLU IMMUNIZE NO ADMIN: HCPCS | Performed by: ORTHOPAEDIC SURGERY

## 2020-12-11 PROCEDURE — 1036F TOBACCO NON-USER: CPT | Performed by: ORTHOPAEDIC SURGERY

## 2020-12-11 NOTE — TELEPHONE ENCOUNTER
Faxing Patient Information     Facility Name: [facility]  Contact Name: DR WALLACE [address] [facility] Number:   Facility Fax Number: [phone] [address] [facility] OP HISTORY AND PHYSICAL FORM TO DR RODRIGO ANGELES'S FOR PATIENT FOR [address] 12/29    PATIENT'S APPOINTMENT IS Monday 12-14-20 AT 11AM  [phone]

## 2020-12-11 NOTE — PROGRESS NOTES
Patient returns today for follow-up of his right shoulder. We feel he at least rotator cuff tendinitis and AC joint arthritis. He did not respond to injections and physical therapy so we sent him for an MRI to see if he could have a small rotator cuff tear. He returns with his MRI today. ROS: Pertinent items are noted in HPI. No notes on file    History reviewed. No pertinent past medical history. History reviewed. No pertinent surgical history. History reviewed. No pertinent family history.       Social History    Socioeconomic History      Marital status:       Spouse name: None      Number of children: None      Years of education: None      Highest education level: None    Occupational History      None    Social Needs      Financial resource strain: None      Food insecurity        Worry: None        Inability: None      Transportation needs        Medical: None        Non-medical: None    Tobacco Use      Smoking status: Never Smoker      Smokeless tobacco: Never Used    Substance and Sexual Activity      Alcohol use: None      Drug use: None      Sexual activity: None    Lifestyle      Physical activity        Days per week: None        Minutes per session: None      Stress: None    Relationships      Social connections        Talks on phone: None        Gets together: None        Attends Mu-ism service: None        Active member of club or organization: None        Attends meetings of clubs or organizations: None        Relationship status: None      Intimate partner violence        Fear of current or ex partner: None        Emotionally abused: None        Physically abused: None        Forced sexual activity: None    Other Topics      Concerns:        None    Social History Narrative      None      Current Outpatient Medications:  Multiple Vitamins-Minerals (CENTRUM SILVER) TABS, Take 1 tablet by mouth, Disp: , Rfl:   VASCEPA 1 g CAPS capsule, TAKE 2 CAPSULES BY MOUTH TWICE A DAY, Disp: , Rfl: 9    No current facility-administered medications for this visit. No Known Allergies    VITAL SIGNS:  Temp 97.2 °F (36.2 °C)   Ht 6' (1.829 m)   Wt 185 lb (83.9 kg)   BMI 25.09 kg/m²   On examination today he can get 180 degrees of active flexion and abduction but hurts above about 90 degrees. He does have some signs of biceps tendinitis as well. He has pretty good internal and external rotation strength with a negative belly press test.    His MRI however shows a about 1-1/2 x 1/2 cm supraspinatus tear. He has AC arthrosis with large inferior osteophyte indenting the rotator cuff. He has a positive bull's-eye sign and possibly some superior labral fraying. Impression: Right shoulder rotator cuff tear and AC arthrosis with possible SLAP tear. Plan: We discussed the options with the patient. He would like to proceed with a arthroscopic rotator cuff repair distal clavicle excision and subacromial decompression. We told him we would look at his biceps tendon attachment and if he has either SLAP tear or biceps tendinosis we can either do tenotomy or tenodesis. The patient would like a tenodesis done as he is thin and in shape. We will try to get him scheduled for this and see him back preoperatively. All questions were answered.

## 2020-12-18 ENCOUNTER — TELEPHONE (OUTPATIENT)
Dept: ORTHOPEDIC SURGERY | Age: 65
End: 2020-12-18

## 2020-12-18 NOTE — PROGRESS NOTES
7. You MUST make arrangements for a responsible adult to stay on site while you are here and take you home after your surgery. You will not be allowed to leave alone or drive yourself home. It is strongly suggested someone stay with you the first 24 hrs. Your surgery will be cancelled if you do not have a ride home. 8. A parent/legal guardian must accompany a child scheduled for surgery and plan to stay at the hospital until the child is discharged. Please do not bring other children with you. 9. Please wear simple, loose fitting clothing to the hospital.  Maninder Ko not bring valuables (money, credit cards, checkbooks, etc.) Do not wear any makeup (including no eye makeup) or nail polish on your fingers or toes. 10. DO NOT wear any jewelry or piercings on day of surgery. All body piercing jewelry must be removed. 11. If you have ___dentures, they will be removed before going to the OR; we will provide you a container. If you wear ___contact lenses or _X__glasses, they will be removed; please bring a case for them. 12. Please see your family doctor/pediatrician for a history & physical and/or concerning medications. Bring any test results/reports from your physician's office. PCP__________________Phone___________H&P Appt. Date________             13 If you  have a Living Will and Durable Power of  for Healthcare, please bring in a copy. 15. Notify your Surgeon if you develop any illness between now and surgery  time, cough, cold, fever, sore throat, nausea, vomiting, etc.  Please notify your surgeon if you experience dizziness, shortness of breath or blurred vision between now & the time of your surgery             15. DO NOT shave your operative site 96 hours prior to surgery. For face & neck surgery, men may use an electric razor 48 hours prior to surgery. 16. Shower the night before or morning of surgery using an antibacterial soap or as you have been instructed. 17. To provide excellent care visitors will be limited to one in the room at any given time. 18.  Please bring picture ID and insurance card. 19.  Visit our web site for additional information:  [email or web address]              20.During flu season no children under the age of 15 are permitted in the hospital for the safety of all patients. 21. If you take a long acting insulin in the evening only  take half of your usual  dose the night  before your procedure              22. If you use a c-pap please bring DOS if staying overnight,             23.For your convenience [facility] has a pharmacy on site to fill your prescriptions. 24. If you use oxygen and have a portable tank please bring it  with you the DOS             25. Bring a complete list of all your medications with name and dose include any supplements. 26. Other__________________________________________   *Please call pre admission testing if you any further questions   Reshma Nolan [address]    [facility]. [place]  [phone]   [address] [place] [address]       All above information reviewed with patient in person or by phone. Patient verbalizes understanding. All questions and concerns addressed.                                                                                                  Patient/Rep____PATIENT________________ Patient instructed to get their COVID-19 test done as directed by their doctor (5-7 days prior to procedure)  or patient states will get on __12/23/2020________. Patient was notified that they need to have an appointment,number to call provided. The day the COVID test is done is considered day one. Instructed to self quarantine after test until DOS. There is a one visitor policy at [facility] for all surgeries and endoscopies. Whether the visitor can stay or will be asked to wait in the car will depend on the current policy and if social distancing can be maintained. The policy is subject to change at any time. Please make sure the visitor has a cell phone that is on,charged and able to accept calls, as this may be the way that the staff communicates with them. Pain management is NO VISITOR policyThe patients ride is expected to remain in the car with a cell phone for communication. If the ride is leaving the hospital grounds please make sure they are back in time for pickup. Have the patient inform the staff on arrival what their rides plans are while the patient is in the facility. At the MAIN there is one visitor allowed. Please note that the visitor policy is subject to change.   PRE OP INSTRUCTIONS

## 2020-12-23 ENCOUNTER — OFFICE VISIT (OUTPATIENT)
Dept: PRIMARY CARE CLINIC | Age: 65
End: 2020-12-23
Payer: COMMERCIAL

## 2020-12-23 PROCEDURE — 99211 OFF/OP EST MAY X REQ PHY/QHP: CPT | Performed by: NURSE PRACTITIONER

## 2020-12-23 PROCEDURE — G8419 CALC BMI OUT NRM PARAM NOF/U: HCPCS | Performed by: NURSE PRACTITIONER

## 2020-12-23 PROCEDURE — G8428 CUR MEDS NOT DOCUMENT: HCPCS | Performed by: NURSE PRACTITIONER

## 2020-12-23 NOTE — PROGRESS NOTES
Dora Tejada received a viral test for COVID-19. They were educated on isolation and quarantine as appropriate. For any symptoms, they were directed to seek care from their PCP, given contact information to establish with a doctor, directed to an urgent care or the emergency room.

## 2020-12-23 NOTE — PATIENT INSTRUCTIONS
You have received a viral test for COVID-19. Below is education on quarantine per the CDC guidelines. For any symptoms, seek care from your PCP, call [phone] to establish care with a doctor, or go directly to an urgent care or the emergency room. Test results will take 2-7 days and will be sent to you in your [employer] account. If you test positive, you will be contacted via phone. If you test negative, the ONLY communication will be through [address] GO TO [employer] AND SIGN UP FOR [employer]  (LOWER LEFT OF THE HOME PAGE)  No test is 100%. If you have symptoms, you should follow the guidance of quarantine as previously stated. You can still be contagious if you have symptoms. Your [facility] will reach out to you if you have a positive result. They will provide you with a return to work date and note. If you were tested for a pre-op, then you should remain in quarantine until your procedure. How do I know if I need to be in quarantine? If you live in a community where COVID-19 is or might be spreading (currently, that is virtually everywhere in the [place])  Be alert for symptoms. Watch for fever, cough, shortness of breath, or other symptoms of COVID-19.  ? Take your temperature if symptoms develop. ? Practice social distancing. Maintain 6 feet of distance from others and stay out of crowded places. ? Follow CDC guidance if symptoms develop. If you feel healthy but:  ? Recently had close contact with a person with COVID-19 you need to Quarantine:  ? Stay home until 14 days after your last exposure. ? Check your temperature twice a day and watch for symptoms of COVID-19.  ? If possible, stay away from people who are at higher-risk for getting very sick from COVID-19. Stay Home and Monitor Your Health if you:  ? Have been diagnosed with COVID-19, or  ? Are waiting for test results, or  ?  Have cough, fever, or shortness of breath, or symptoms of COVID-19 When You Can be Around Others After You Had or Likely Had COVID-19     If you have or think you might have COVID-19, it is important to stay home and away from other people. Staying away from others helps stop the spread of COVID-19. If you have an emergency warning sign (including trouble breathing), get emergency medical care immediately. When you can be around others (end home isolation) depends on different factors for different situations. Find CDC's recommendations for your situation below. I think or know I had COVID-19, and I had symptoms  You can be with others after  ? 3 days with no fever and  ? Respiratory symptoms have improved (e.g. cough, shortness of breath) and  ? 10 days since symptoms first appeared  Depending on your healthcare provider's advice and availability of testing, you might get tested to see if you still have COVID-19. If you will be tested, you can be around others when you have no fever, respiratory symptoms have improved, and you receive two negative test results in a row, at least 24 hours apart. I tested positive for COVID-19 but had no symptoms  If you continue to have no symptoms, you can be with others after:  ? 10 days have passed since test or 14 days since your exposure test   Depending on your healthcare provider's advice and availability of testing, you might get tested to see if you still have COVID-19. If you will be tested, you can be around others after you receive two negative test results in a row, at least 24 hours apart. If you develop symptoms after testing positive, follow the guidance above for I think or know I had COVID, and I had symptoms.   For Anyone Who Has Been Around a Person with COVID-19  It is important to remember that anyone who has close contact with someone with COVID-19 should stay home for 14 days after exposure based on the time it takes to develop illness. Testing is not necessary.     www.cdc.gov/coronavirus/2019-ncov/index.html

## 2020-12-24 LAB — SARS-COV-2, NAA: NOT DETECTED

## 2020-12-28 ENCOUNTER — OFFICE VISIT (OUTPATIENT)
Dept: ORTHOPEDIC SURGERY | Age: 65
End: 2020-12-28

## 2020-12-28 VITALS — WEIGHT: 185 LBS | TEMPERATURE: 97 F | HEIGHT: 72 IN | BODY MASS INDEX: 25.06 KG/M2

## 2020-12-28 PROCEDURE — 99999 PR OFFICE/OUTPT VISIT,PROCEDURE ONLY: CPT | Performed by: ORTHOPAEDIC SURGERY

## 2020-12-28 RX ORDER — OXYCODONE HYDROCHLORIDE AND ACETAMINOPHEN 5; 325 MG/1; MG/1
1-2 TABLET ORAL EVERY 4 HOURS PRN
Qty: 40 TABLET | Refills: 0 | Status: SHIPPED | OUTPATIENT
Start: 2020-12-28 | End: 2021-01-04

## 2020-12-28 NOTE — PROGRESS NOTES
Patient returns today for preop visit for his right shoulder. He scheduled for an arthroscopic subacromial decompression distal clavicle excision rotator cuff repair and possible open biceps tenodesis. ROS: Pertinent items are noted in HPI. No notes on file    Past Medical History:  No date: Crohn disease ([address]  No date: Hyperlipidemia     Past Surgical History:  No date: COLONOSCOPY  No date: SMALL INTESTINE SURGERY    History reviewed. No pertinent family history.       Social History    Socioeconomic History      Marital status:       Spouse name: None      Number of children: None      Years of education: None      Highest education level: None    Occupational History      None    Social Needs      Financial resource strain: None      Food insecurity        Worry: None        Inability: None      Transportation needs        Medical: None        Non-medical: None    Tobacco Use      Smoking status: Never Smoker      Smokeless tobacco: Never Used    Substance and Sexual Activity      Alcohol use: Yes        Comment: 3-5/ WEEK      Drug use: Never      Sexual activity: None    Lifestyle      Physical activity        Days per week: None        Minutes per session: None      Stress: None    Relationships      Social connections        Talks on phone: None        Gets together: None        Attends Jehovah's witness service: None        Active member of club or organization: None        Attends meetings of clubs or organizations: None        Relationship status: None      Intimate partner violence        Fear of current or ex partner: None        Emotionally abused: None        Physically abused: None        Forced sexual activity: None    Other Topics      Concerns:        None    Social History Narrative      None      Current Outpatient Medications:    Multiple Vitamins-Minerals (PRESERVISION AREDS PO), Take by mouth 2 times daily, Disp: , Rfl:

## 2020-12-29 ENCOUNTER — ANESTHESIA EVENT (OUTPATIENT)
Dept: OPERATING ROOM | Age: 65
End: 2020-12-29
Payer: COMMERCIAL

## 2020-12-29 ENCOUNTER — ANESTHESIA (OUTPATIENT)
Dept: OPERATING ROOM | Age: 65
End: 2020-12-29
Payer: COMMERCIAL

## 2020-12-29 ENCOUNTER — HOSPITAL ENCOUNTER (OUTPATIENT)
Age: 65
Setting detail: OUTPATIENT SURGERY
Discharge: HOME | End: 2020-12-29
Attending: ORTHOPAEDIC SURGERY | Admitting: ORTHOPAEDIC SURGERY
Payer: COMMERCIAL

## 2020-12-29 VITALS
RESPIRATION RATE: 16 BRPM | HEIGHT: 72 IN | HEART RATE: 82 BPM | OXYGEN SATURATION: 99 % | SYSTOLIC BLOOD PRESSURE: 132 MMHG | TEMPERATURE: 97.2 F | BODY MASS INDEX: 25.47 KG/M2 | WEIGHT: 188 LBS | DIASTOLIC BLOOD PRESSURE: 84 MMHG

## 2020-12-29 VITALS
SYSTOLIC BLOOD PRESSURE: 114 MMHG | RESPIRATION RATE: 5 BRPM | OXYGEN SATURATION: 98 % | TEMPERATURE: 96.8 F | DIASTOLIC BLOOD PRESSURE: 65 MMHG

## 2020-12-29 PROCEDURE — 6360000002 HC RX W HCPCS: Performed by: ORTHOPAEDIC SURGERY

## 2020-12-29 PROCEDURE — 3600000004 HC SURGERY LEVEL 4 BASE: Performed by: ORTHOPAEDIC SURGERY

## 2020-12-29 PROCEDURE — 3700000000 HC ANESTHESIA ATTENDED CARE: Performed by: ORTHOPAEDIC SURGERY

## 2020-12-29 PROCEDURE — 7100000010 HC PHASE II RECOVERY - FIRST 15 MIN: Performed by: ORTHOPAEDIC SURGERY

## 2020-12-29 PROCEDURE — 7100000000 HC PACU RECOVERY - FIRST 15 MIN: Performed by: ORTHOPAEDIC SURGERY

## 2020-12-29 PROCEDURE — 2500000003 HC RX 250 WO HCPCS: Performed by: ORTHOPAEDIC SURGERY

## 2020-12-29 PROCEDURE — L3660 SO 8 AB RSTR CAN/WEB PRE OTS: HCPCS | Performed by: ORTHOPAEDIC SURGERY

## 2020-12-29 PROCEDURE — 7100000011 HC PHASE II RECOVERY - ADDTL 15 MIN: Performed by: ORTHOPAEDIC SURGERY

## 2020-12-29 PROCEDURE — 3700000001 HC ADD 15 MINUTES (ANESTHESIA): Performed by: ORTHOPAEDIC SURGERY

## 2020-12-29 PROCEDURE — 7100000001 HC PACU RECOVERY - ADDTL 15 MIN: Performed by: ORTHOPAEDIC SURGERY

## 2020-12-29 PROCEDURE — 3600000014 HC SURGERY LEVEL 4 ADDTL 15MIN: Performed by: ORTHOPAEDIC SURGERY

## 2020-12-29 PROCEDURE — 6360000002 HC RX W HCPCS: Performed by: ANESTHESIOLOGY

## 2020-12-29 PROCEDURE — 2580000003 HC RX 258: Performed by: ORTHOPAEDIC SURGERY

## 2020-12-29 PROCEDURE — 2709999900 HC NON-CHARGEABLE SUPPLY: Performed by: ORTHOPAEDIC SURGERY

## 2020-12-29 PROCEDURE — 6360000002 HC RX W HCPCS: Performed by: NURSE ANESTHETIST, CERTIFIED REGISTERED

## 2020-12-29 PROCEDURE — 2500000003 HC RX 250 WO HCPCS: Performed by: NURSE ANESTHETIST, CERTIFIED REGISTERED

## 2020-12-29 PROCEDURE — 64415 NJX AA&/STRD BRCH PLXS IMG: CPT | Performed by: ANESTHESIOLOGY

## 2020-12-29 PROCEDURE — 2720000010 HC SURG SUPPLY STERILE: Performed by: ORTHOPAEDIC SURGERY

## 2020-12-29 PROCEDURE — C1713 ANCHOR/SCREW BN/BN,TIS/BN: HCPCS | Performed by: ORTHOPAEDIC SURGERY

## 2020-12-29 DEVICE — ANCHOR SUTURE BIOCOMP 4.75X19.1 MM SWIVELOCK C: Type: IMPLANTABLE DEVICE | Site: SHOULDER | Status: FUNCTIONAL

## 2020-12-29 DEVICE — SCREW INTRF BIOCOMP 7X10 MM FACILITATE BIOTENODESIS: Type: IMPLANTABLE DEVICE | Site: SHOULDER | Status: FUNCTIONAL

## 2020-12-29 DEVICE — HEALICOIL RG SA 4.75MM W/2 UB-BL                                    CBRD BL
Type: IMPLANTABLE DEVICE | Site: SHOULDER | Status: FUNCTIONAL
Brand: HEALICOIL

## 2020-12-29 RX ORDER — SODIUM CHLORIDE, SODIUM LACTATE, POTASSIUM CHLORIDE, CALCIUM CHLORIDE 600; 310; 30; 20 MG/100ML; MG/100ML; MG/100ML; MG/100ML
INJECTION, SOLUTION INTRAVENOUS CONTINUOUS
Status: DISCONTINUED | OUTPATIENT
Start: 2020-12-29 | End: 2020-12-29 | Stop reason: HOSPADM

## 2020-12-29 RX ORDER — HYDROMORPHONE HCL 110MG/55ML
0.25 PATIENT CONTROLLED ANALGESIA SYRINGE INTRAVENOUS EVERY 5 MIN PRN
Status: DISCONTINUED | OUTPATIENT
Start: 2020-12-29 | End: 2020-12-29 | Stop reason: HOSPADM

## 2020-12-29 RX ORDER — SODIUM CHLORIDE 9 MG/ML
INJECTION, SOLUTION INTRAVENOUS CONTINUOUS
Status: DISCONTINUED | OUTPATIENT
Start: 2020-12-29 | End: 2020-12-29 | Stop reason: HOSPADM

## 2020-12-29 RX ORDER — FENTANYL CITRATE 50 UG/ML
50 INJECTION, SOLUTION INTRAMUSCULAR; INTRAVENOUS ONCE
Status: COMPLETED | OUTPATIENT
Start: 2020-12-29 | End: 2020-12-29

## 2020-12-29 RX ORDER — PHENYLEPHRINE HCL IN 0.9% NACL 1 MG/10 ML
SYRINGE (ML) INTRAVENOUS PRN
Status: DISCONTINUED | OUTPATIENT
Start: 2020-12-29 | End: 2020-12-29 | Stop reason: SDUPTHER

## 2020-12-29 RX ORDER — PROPOFOL 10 MG/ML
INJECTION, EMULSION INTRAVENOUS PRN
Status: DISCONTINUED | OUTPATIENT
Start: 2020-12-29 | End: 2020-12-29 | Stop reason: SDUPTHER

## 2020-12-29 RX ORDER — LIDOCAINE HYDROCHLORIDE 10 MG/ML
1 INJECTION, SOLUTION EPIDURAL; INFILTRATION; INTRACAUDAL; PERINEURAL
Status: DISCONTINUED | OUTPATIENT
Start: 2020-12-29 | End: 2020-12-29 | Stop reason: HOSPADM

## 2020-12-29 RX ORDER — HYDROMORPHONE HCL 110MG/55ML
0.5 PATIENT CONTROLLED ANALGESIA SYRINGE INTRAVENOUS EVERY 5 MIN PRN
Status: DISCONTINUED | OUTPATIENT
Start: 2020-12-29 | End: 2020-12-29 | Stop reason: HOSPADM

## 2020-12-29 RX ORDER — LIDOCAINE HYDROCHLORIDE 10 MG/ML
0.5 INJECTION, SOLUTION EPIDURAL; INFILTRATION; INTRACAUDAL; PERINEURAL ONCE
Status: DISCONTINUED | OUTPATIENT
Start: 2020-12-29 | End: 2020-12-29 | Stop reason: HOSPADM

## 2020-12-29 RX ORDER — ROPIVACAINE HYDROCHLORIDE 5 MG/ML
INJECTION, SOLUTION EPIDURAL; INFILTRATION; PERINEURAL
Status: COMPLETED | OUTPATIENT
Start: 2020-12-29 | End: 2020-12-29

## 2020-12-29 RX ORDER — LIDOCAINE HYDROCHLORIDE 20 MG/ML
INJECTION, SOLUTION EPIDURAL; INFILTRATION; INTRACAUDAL; PERINEURAL PRN
Status: DISCONTINUED | OUTPATIENT
Start: 2020-12-29 | End: 2020-12-29 | Stop reason: SDUPTHER

## 2020-12-29 RX ORDER — MIDAZOLAM HYDROCHLORIDE 2 MG/2ML
1 INJECTION, SOLUTION INTRAMUSCULAR; INTRAVENOUS ONCE
Status: COMPLETED | OUTPATIENT
Start: 2020-12-29 | End: 2020-12-29

## 2020-12-29 RX ORDER — HYDROCODONE BITARTRATE AND ACETAMINOPHEN 5; 325 MG/1; MG/1
1 TABLET ORAL
Status: DISCONTINUED | OUTPATIENT
Start: 2020-12-29 | End: 2020-12-29 | Stop reason: HOSPADM

## 2020-12-29 RX ORDER — ONDANSETRON 2 MG/ML
INJECTION INTRAMUSCULAR; INTRAVENOUS PRN
Status: DISCONTINUED | OUTPATIENT
Start: 2020-12-29 | End: 2020-12-29 | Stop reason: SDUPTHER

## 2020-12-29 RX ORDER — ONDANSETRON 2 MG/ML
4 INJECTION INTRAMUSCULAR; INTRAVENOUS
Status: DISCONTINUED | OUTPATIENT
Start: 2020-12-29 | End: 2020-12-29 | Stop reason: HOSPADM

## 2020-12-29 RX ORDER — DEXAMETHASONE SODIUM PHOSPHATE 4 MG/ML
INJECTION, SOLUTION INTRA-ARTICULAR; INTRALESIONAL; INTRAMUSCULAR; INTRAVENOUS; SOFT TISSUE PRN
Status: DISCONTINUED | OUTPATIENT
Start: 2020-12-29 | End: 2020-12-29 | Stop reason: SDUPTHER

## 2020-12-29 RX ORDER — FENTANYL CITRATE 50 UG/ML
INJECTION, SOLUTION INTRAMUSCULAR; INTRAVENOUS PRN
Status: DISCONTINUED | OUTPATIENT
Start: 2020-12-29 | End: 2020-12-29 | Stop reason: SDUPTHER

## 2020-12-29 RX ADMIN — SODIUM CHLORIDE, POTASSIUM CHLORIDE, SODIUM LACTATE AND CALCIUM CHLORIDE: 600; 310; 30; 20 INJECTION, SOLUTION INTRAVENOUS at 11:50

## 2020-12-29 RX ADMIN — DEXAMETHASONE SODIUM PHOSPHATE 10 MG: 4 INJECTION, SOLUTION INTRAMUSCULAR; INTRAVENOUS at 13:03

## 2020-12-29 RX ADMIN — FENTANYL CITRATE 50 MCG: 50 INJECTION, SOLUTION INTRAMUSCULAR; INTRAVENOUS at 12:52

## 2020-12-29 RX ADMIN — ROPIVACAINE HYDROCHLORIDE 25 ML: 5 INJECTION, SOLUTION EPIDURAL; INFILTRATION; PERINEURAL at 12:40

## 2020-12-29 RX ADMIN — TRANEXAMIC ACID 1259 MG: 1 INJECTION, SOLUTION INTRAVENOUS at 12:33

## 2020-12-29 RX ADMIN — FENTANYL CITRATE 50 MCG: 50 INJECTION, SOLUTION INTRAMUSCULAR; INTRAVENOUS at 14:20

## 2020-12-29 RX ADMIN — Medication 100 MCG: at 13:30

## 2020-12-29 RX ADMIN — Medication 100 MCG: at 13:43

## 2020-12-29 RX ADMIN — ONDANSETRON 4 MG: 2 INJECTION INTRAMUSCULAR; INTRAVENOUS at 13:03

## 2020-12-29 RX ADMIN — SODIUM CHLORIDE, POTASSIUM CHLORIDE, SODIUM LACTATE AND CALCIUM CHLORIDE: 600; 310; 30; 20 INJECTION, SOLUTION INTRAVENOUS at 14:08

## 2020-12-29 RX ADMIN — MIDAZOLAM 1 MG: 1 INJECTION INTRAMUSCULAR; INTRAVENOUS at 12:35

## 2020-12-29 RX ADMIN — FENTANYL CITRATE 50 MCG: 50 INJECTION, SOLUTION INTRAMUSCULAR; INTRAVENOUS at 12:35

## 2020-12-29 RX ADMIN — LIDOCAINE HYDROCHLORIDE 60 MG: 20 INJECTION, SOLUTION EPIDURAL; INFILTRATION; INTRACAUDAL; PERINEURAL at 12:57

## 2020-12-29 RX ADMIN — CEFAZOLIN SODIUM 2 G: 10 INJECTION, POWDER, FOR SOLUTION INTRAVENOUS at 12:48

## 2020-12-29 RX ADMIN — PROPOFOL 200 MG: 10 INJECTION, EMULSION INTRAVENOUS at 12:57

## 2020-12-29 ASSESSMENT — PULMONARY FUNCTION TESTS
PIF_VALUE: 2
PIF_VALUE: 16
PIF_VALUE: 15
PIF_VALUE: 14
PIF_VALUE: 2
PIF_VALUE: 15
PIF_VALUE: 2
PIF_VALUE: 2
PIF_VALUE: 13
PIF_VALUE: 2
PIF_VALUE: 2
PIF_VALUE: 15
PIF_VALUE: 15
PIF_VALUE: 2
PIF_VALUE: 1
PIF_VALUE: 14
PIF_VALUE: 15
PIF_VALUE: 2
PIF_VALUE: 15
PIF_VALUE: 2
PIF_VALUE: 15
PIF_VALUE: 2
PIF_VALUE: 15
PIF_VALUE: 2
PIF_VALUE: 15
PIF_VALUE: 2
PIF_VALUE: 15
PIF_VALUE: 16
PIF_VALUE: 2
PIF_VALUE: 15
PIF_VALUE: 2
PIF_VALUE: 13
PIF_VALUE: 2
PIF_VALUE: 15
PIF_VALUE: 2
PIF_VALUE: 15
PIF_VALUE: 2
PIF_VALUE: 2
PIF_VALUE: 15
PIF_VALUE: 13
PIF_VALUE: 0
PIF_VALUE: 2
PIF_VALUE: 13
PIF_VALUE: 1
PIF_VALUE: 15
PIF_VALUE: 2
PIF_VALUE: 1
PIF_VALUE: 15
PIF_VALUE: 2
PIF_VALUE: 15
PIF_VALUE: 15
PIF_VALUE: 13
PIF_VALUE: 14
PIF_VALUE: 2
PIF_VALUE: 16
PIF_VALUE: 2
PIF_VALUE: 15
PIF_VALUE: 15
PIF_VALUE: 0
PIF_VALUE: 15
PIF_VALUE: 2
PIF_VALUE: 15
PIF_VALUE: 2
PIF_VALUE: 13
PIF_VALUE: 15
PIF_VALUE: 2
PIF_VALUE: 2
PIF_VALUE: 15
PIF_VALUE: 2
PIF_VALUE: 15
PIF_VALUE: 13
PIF_VALUE: 15
PIF_VALUE: 11
PIF_VALUE: 23
PIF_VALUE: 15
PIF_VALUE: 2
PIF_VALUE: 15
PIF_VALUE: 15
PIF_VALUE: 2
PIF_VALUE: 2
PIF_VALUE: 13
PIF_VALUE: 13
PIF_VALUE: 15
PIF_VALUE: 2
PIF_VALUE: 24
PIF_VALUE: 2
PIF_VALUE: 16
PIF_VALUE: 13
PIF_VALUE: 15
PIF_VALUE: 2
PIF_VALUE: 2
PIF_VALUE: 15
PIF_VALUE: 15
PIF_VALUE: 14
PIF_VALUE: 2
PIF_VALUE: 1
PIF_VALUE: 15
PIF_VALUE: 14
PIF_VALUE: 15
PIF_VALUE: 2
PIF_VALUE: 15
PIF_VALUE: 3
PIF_VALUE: 15
PIF_VALUE: 2
PIF_VALUE: 15
PIF_VALUE: 15
PIF_VALUE: 2
PIF_VALUE: 16
PIF_VALUE: 15
PIF_VALUE: 15
PIF_VALUE: 13
PIF_VALUE: 15
PIF_VALUE: 14
PIF_VALUE: 15
PIF_VALUE: 0
PIF_VALUE: 16
PIF_VALUE: 15
PIF_VALUE: 13
PIF_VALUE: 14
PIF_VALUE: 2
PIF_VALUE: 13
PIF_VALUE: 15
PIF_VALUE: 16
PIF_VALUE: 15
PIF_VALUE: 2
PIF_VALUE: 15
PIF_VALUE: 2
PIF_VALUE: 15
PIF_VALUE: 2
PIF_VALUE: 2
PIF_VALUE: 15
PIF_VALUE: 2

## 2020-12-29 ASSESSMENT — PAIN - FUNCTIONAL ASSESSMENT
PAIN_FUNCTIONAL_ASSESSMENT: 0-10
PAIN_FUNCTIONAL_ASSESSMENT: PREVENTS OR INTERFERES SOME ACTIVE ACTIVITIES AND ADLS

## 2020-12-29 ASSESSMENT — PAIN SCALES - GENERAL: PAINLEVEL_OUTOF10: 0

## 2020-12-29 ASSESSMENT — PAIN DESCRIPTION - DESCRIPTORS: DESCRIPTORS: ACHING

## 2020-12-29 NOTE — PROGRESS NOTES
Time out done,02 on @ 2 l/min per n/c, then versed & fentanyl IV given, then Dr Javier Figueroa completed right intrascalene nerve block, patient tolerated procedure well.

## 2020-12-29 NOTE — PROGRESS NOTES
Patient awake , denies any pain at this time due to interscalene block. Taking sips of ginger ale. Patient meets criteria for transfer from Phase 1 to Phase 2. Status stable.

## 2020-12-29 NOTE — PROGRESS NOTES
Patient arrived from OR to PACU spot 6. Attached to monitoring system. Report received per CRNA and OR nurse. No problems reported intraoperatively . VSS. Patient arrived somnolent with oral airway in place. Will continue to observe closely.

## 2020-12-29 NOTE — H&P
Minutes per session: None    Stress: None   Relationships    Social connections     Talks on phone: None     Gets together: None     Attends Yazidism service: None     Active member of club or organization: None     Attends meetings of clubs or organizations: None     Relationship status: None    Intimate partner violence     Fear of current or ex partner: None     Emotionally abused: None     Physically abused: None     Forced sexual activity: None   Other Topics Concern    None   Social History Narrative    None       Family History:  History reviewed. No pertinent family history. REVIEW OF SYSTEMS:  Review of Systems   Constitutional: Negative for fever and chills. HENT: Negative for congestion and eye pain. Eyes: Negative for blurred vision and double vision. Respiratory: Negative for cough, shortness of breath and wheezing. Cardiovascular: Negative for chest pain and palpitations. Gastrointestinal: Negative for nausea. Negative for vomiting. Musculoskeletal: Positive for myalgias and joint pain right shoulder. Skin: Negative for itching and rash. Neurological: Negative for dizziness, sensory change and headaches. Psychiatric/Behavioral: Negative for depression and suicidal ideas. PHYSICAL EXAM:  Height 6' (1.829 m), weight 188 lb (85.3 kg). Gen: alert and oriented  Chest: symmetric chest excursion, non labored breathing  Heart: RRR   RUE: No ecchymoses, abrasion, deformity, or lacerations. Skin intact. TTP about the cuff footprint. Compartments soft. Ulnar/Median/AIN/PIN motor intact 5/5. C4-T1 sensation grossly intact. Radial pulse 2+ with BCR        LABS:  No results for input(s): WBC, HGB, HCT, PLT, INR, PTT, NA, K, BUN, CREATININE, GLUCOSE in the last 72 hours.     Invalid input(s): PT       A/P: 63 y.o. male R shoulder pain with rotator cuff tear  - OR for R shoulder arthroscopy with rotator cuff repair, distal clavicle excision, possible biceps tenodesis - NPO since midnight  - site marked  - abx on hold for OR  - consent in chart    Francisco Holloway DO   11:46 AM 12/29/2020

## 2020-12-29 NOTE — ANESTHESIA PRE PROCEDURE
Department of Anesthesiology  Preprocedure Note       Name:  Garrett Pearce   Age:  63 y.o.  :  1955                                          MRN:  [id number]         Date:  2020      Surgeon: Darian León):  Kim Fall MD    Procedure: Procedure(s):  RIGHT SHOULDER ARTHROSCOPE, SUBACROMIAL DECOMPRESSION, DISTAL CLAVICLE EXCISION, ROTATOR CUFF REPAIR (23671, [phone], [id number], 63045) - RIGHT INTERSCALENE BLOCK    Medications prior to admission:   Prior to Admission medications    Medication Sig Start Date End Date Taking? Authorizing Provider   Multiple Vitamins-Minerals (PRESERVISION AREDS PO) Take by mouth 2 times daily   Yes Historical Provider, MD   Multiple Vitamins-Minerals (CENTRUM SILVER) TABS Take 1 tablet by mouth daily    Yes Historical Provider, MD   VASCEPA 1 g CAPS capsule TAKE 2 CAPSULES BY MOUTH TWICE A DAY 5/3/18  Yes Historical Provider, MD   PERCOCET 5-325 MG per tablet Take 1-2 tablets by mouth every 4 hours as needed for Pain for up to 7 days.  1-2 tablets every 4 hours as needed for pain 20  Kim Fall MD       Current medications:    Current Facility-Administered Medications   Medication Dose Route Frequency Provider Last Rate Last Admin    lactated ringers infusion   Intravenous Continuous Kim Fall MD 50 mL/hr at 20 1150 New Bag at 20 1150    lidocaine PF 1 % injection 0.5 mL  0.5 mL Intradermal Once Kim Fall MD        ceFAZolin (ANCEF) 2 g in dextrose 5 % 100 mL IVPB  2 g Intravenous On Call to Rell Diaz MD        tranexamic acid (CYKLOKAPRON) 1,259 mg in sodium chloride 0.9 % 50 mL IVPB  15 mg/kg Intravenous Once Kim Fall MD        HYDROcodone-acetaminophen (NORCO) 5-325 MG per tablet 1 tablet  1 tablet Oral Once PRN Tania Arrington MD        ondansetron [facility]) injection 4 mg  4 mg Intravenous Once PRN Tania Arrington MD  0.9 % sodium chloride infusion   Intravenous Continuous Obdulio Yu MD        lidocaine PF 1 % injection 1 mL  1 mL Intradermal Once PRN Obdulio Yu MD        HYDROmorphone (DILAUDID) injection 0.25 mg  0.25 mg Intravenous Q5 Min PRN Obdulio Yu MD        HYDROmorphone (DILAUDID) injection 0.5 mg  0.5 mg Intravenous Q5 Min PRN Obdulio Yu MD           Allergies:  No Known Allergies    Problem List:  There is no problem list on file for this patient. Past Medical History:        Diagnosis Date    Crohn disease ([address]     Hyperlipidemia        Past Surgical History:        Procedure Laterality Date    COLONOSCOPY      SMALL INTESTINE SURGERY         Social History:    Social History     Tobacco Use    Smoking status: Never Smoker    Smokeless tobacco: Never Used   Substance Use Topics    Alcohol use: Yes     Comment: 3-5/ WEEK                                Counseling given: Not Answered      Vital Signs (Current):   Vitals:    12/18/20 1404 12/29/20 1135 12/29/20 1148   BP:   (!) 137/91   Pulse:   84   Resp:   16   Temp:   97.7 °F (36.5 °C)   TempSrc:   Temporal   SpO2:   98%   Weight: 185 lb (83.9 kg) 188 lb (85.3 kg)    Height: 6' (1.829 m) 6' (1.829 m)                                               BP Readings from Last 3 Encounters:   12/29/20 (!) 137/91   09/14/18 125/89   05/22/18 138/89       NPO Status: Time of last liquid consumption: 2300                        Time of last solid consumption: 2300                        Date of last liquid consumption: 12/28/20                        Date of last solid food consumption: 12/28/20    BMI:   Wt Readings from Last 3 Encounters:   12/29/20 188 lb (85.3 kg)   12/28/20 185 lb (83.9 kg)   12/11/20 185 lb (83.9 kg)     Body mass index is 25.5 kg/m².     CBC: No results found for: WBC, RBC, HGB, HCT, MCV, RDW, PLT CMP: No results found for: NA, K, CL, CO2, BUN, CREATININE, GFRAA, AGRATIO, LABGLOM, GLUCOSE, PROT, CALCIUM, BILITOT, ALKPHOS, AST, ALT    POC Tests: No results for input(s): POCGLU, POCNA, POCK, POCCL, POCBUN, POCHEMO, POCHCT in the last 72 hours. Coags: No results found for: PROTIME, INR, APTT    HCG (If Applicable): No results found for: PREGTESTUR, PREGSERUM, HCG, HCGQUANT     ABGs: No results found for: PHART, PO2ART, JYP4CWJ, WFG4WSC, BEART, V4AEHJRZ     Type & Screen (If Applicable):  No results found for: LABABO, LABRH    Drug/Infectious Status (If Applicable):  No results found for: HIV, HEPCAB    COVID-19 Screening (If Applicable):   Lab Results   Component Value Date    COVID19 NOT DETECTED 12/23/2020         Anesthesia Evaluation  Patient summary reviewed no history of anesthetic complications:   Airway: Mallampati: III  TM distance: >3 FB   Neck ROM: full  Mouth opening: > = 3 FB Dental: normal exam         Pulmonary:Negative Pulmonary ROS breath sounds clear to auscultation                             Cardiovascular:  Exercise tolerance: good (>4 METS),   (+) hyperlipidemia    (-) CABG/stent, dysrhythmias and  angina      Rhythm: regular  Rate: normal                    Neuro/Psych:      (-) seizures, TIA and CVA           GI/Hepatic/Renal:            ROS comment: Crohn's  occ gerd sx, none today or recently. Endo/Other:                     Abdominal:           Vascular:                                        Anesthesia Plan      general and regional     ASA 2     (Risks of regional nerve block discussed include infection, hematoma, intravascular injection, temporary or permanent nerve damage. Patient verbalizes understanding and his wishes to proceed with planned anesthetic.  )  Induction: intravenous. MIPS: Postoperative opioids intended, Prophylactic antiemetics administered and Postoperative trial extubation. Anesthetic plan and risks discussed with patient.

## 2020-12-29 NOTE — ANESTHESIA PROCEDURE NOTES
Peripheral Block    Patient location during procedure: pre-op  Start time: 12/29/2020 12:39 PM  End time: 12/29/2020 12:41 PM  Staffing  Performed: anesthesiologist   Anesthesiologist: Bozena Ge MD  Preanesthetic Checklist  Completed: patient identified, IV checked, site marked, risks and benefits discussed, surgical consent, monitors and equipment checked, pre-op evaluation, timeout performed, anesthesia consent given, oxygen available and patient being monitored  Peripheral Block  Patient position: sitting  Prep: ChloraPrep  Patient monitoring: cardiac monitor, continuous pulse ox, frequent blood pressure checks and IV access  Block type: Brachial plexus  Laterality: right  Injection technique: single-shot  Guidance: nerve stimulator, ultrasound guided and good motor response down to 0.4 mA  Interscalene  Provider prep: mask and sterile gloves  Needle  Needle type: nerve stimulator needle, insulated, non cutting tip. Needle gauge: 22 G  Needle length: 5 cm  Needle localization: anatomical landmarks, nerve stimulator and ultrasound guidance  Assessment  Injection assessment: negative aspiration for heme, no paresthesia on injection and local visualized surrounding nerve on ultrasound (neg aspiration for heme every 3-5 ml)  Paresthesia pain: none  Slow fractionated injection: yes  Hemodynamics: stable  Additional Notes  Patient tolerated procedure well. No immediate complications.   Medications Administered  Ropivacaine (NAROPIN) injection 0.5%, 25 mL  Reason for block: post-op pain management and at surgeon's request

## 2020-12-29 NOTE — PROGRESS NOTES
Patient doing well. IV c'd and patient assisted with dressing. Discharge instructions discussed. All questions and concerns addressed. Patient discharged to car via wheelchair. Status stable.

## 2020-12-30 ENCOUNTER — HOSPITAL ENCOUNTER (OUTPATIENT)
Dept: PHYSICAL THERAPY | Age: 65
Setting detail: THERAPIES SERIES
Discharge: HOME | End: 2020-12-30
Payer: COMMERCIAL

## 2020-12-30 ENCOUNTER — OFFICE VISIT (OUTPATIENT)
Dept: ORTHOPEDIC SURGERY | Age: 65
End: 2020-12-30

## 2020-12-30 VITALS — BODY MASS INDEX: 25.47 KG/M2 | TEMPERATURE: 98.6 F | HEIGHT: 72 IN | WEIGHT: 188 LBS

## 2020-12-30 PROCEDURE — 97161 PT EVAL LOW COMPLEX 20 MIN: CPT | Performed by: PHYSICAL THERAPIST

## 2020-12-30 PROCEDURE — 97110 THERAPEUTIC EXERCISES: CPT | Performed by: PHYSICAL THERAPIST

## 2020-12-30 PROCEDURE — 99024 POSTOP FOLLOW-UP VISIT: CPT | Performed by: ORTHOPAEDIC SURGERY

## 2020-12-30 NOTE — PLAN OF CARE
[facility]      Physical Therapy Certification    Dear Referring Practitioner: Shmuel Antonio. Jose Mcclelland MD,    We had the pleasure of evaluating the following patient for physical therapy services at [address] [place] [address]. A summary of our findings can be found in the initial assessment below. This includes our plan of care. If you have any questions or concerns regarding these findings, please do not hesitate to contact me at the office phone number checked above. Thank you for the referral.       Physician Signature:_______________________________Date:__________________  By signing above (or electronic signature), therapists plan is approved by physician      Patient: Jerrell Zhou   : 1955   MRN: [id number]  Referring Physician: Referring Practitioner: Shmuel Antonio.  Jose Mcclelland MD      Evaluation Date: 2020      Medical Diagnosis Information:  Diagnosis: M75.121 (ICD-10-CM) - Nontraumatic complete tear of right rotator cuff   Treatment Diagnosis: M25.511, M25.611, R53.1                                         Insurance information: PT Insurance Information: PT BENEFITS  FAILITY/ UHC/ EFFECTIVE 20/ ACTIVE/ DED 3000 MET 1020/ PAYS 80%/ 20 VPCY HARD MAX/ ALLCODES BILLABLE/ 0 AUTH/ ACCESS DID NOT GET NAME/ REF# [id number]/ ACCESS CALLED FOR BENEFITS /20 PAG    Precautions/ Contra-indications:     C-SSRS Triggered by Intake questionnaire (Past 2 wk assessment):   [x] No, Questionnaire did not trigger screening.   [] Yes, Patient intake triggered further evaluation      [] C-SSRS Screening completed  [] PCP notified via Plan of Care  [] Emergency services notified     Latex Allergy:  [x]NO      []YES  Preferred Language for Healthcare:   [x]English       []other: SUBJECTIVE: Patient stated complaint: Pt presents to PT 1 day s/p R Right shoulder arthroscopy with subacromial decompression, distal clavicle excision, rotator cuff repair, and open biceps tenodesis. States that his pain level is pretty high, about 9/10. Pt states that he is taking pain medication every 4 hours, helps with pain for about 3 hours and then he is in a lot of pain until the 4th hour when he can take medication again. Pt denies N/V/D. Nerve block wore off yesterday. Hobbies: golf, being active, working in garden  Goals: get back to normal function, be able to lift/carry/throw with limitation    Relevant Medical History: see intake form  Functional Disability Index:  UEFI 0/80=0% (100% deficit)    Pain Scale: 9/10  Easing factors: pain medication  Provocative factors: general movement    Type: [x]Constant   []Intermittent  []Radiating []Localized []other:     Numbness/Tingling: None. Nerve block has worn off. Occupation/School: Desk job. Living Status/Prior Level of Function: Independent with ADLs and IADLs. OBJECTIVE:      Not assessed d/t recent surgery  ROM PROM AROM  Comment    L R L R    Flexion        Abduction        ER        IR          Not assessed d/t recent surgery   Strength L R Comment   Flexion      Abduction      ER      IR      Upper Trap      Lower Trap      Mid Trap      Biceps      Triceps        Not assessed d/t recent surgery  Special Tests Results/Comment   Margo Tanner    Speeds    OBriens    Apprehension     Load & Shift         Reflexes/Sensation:               [x]Dermatomes/Myotomes intact myotomes not assessed              []Reflexes equal and normal bilaterally               []Other:     Joint mobility: Not assessed d/t recent surgery              []Normal               []Hypo              []Hyper     Palpation: generalized TTP over surgical sites     Functional Mobility/Transfers: decreased use of RUE d/t surgery. + ultrasling     Posture:  WNL Bandages/Dressings/Incisions:   12/30/20 post op dressings removed in clinic, incisions clean and dry. Small area of redness at base of open incision, will continue to monitor. [x] Patient history, allergies, meds reviewed. Medical chart reviewed. See intake form. Review Of Systems (ROS):  [x]Performed Review of systems (Integumentary, CardioPulmonary, Neurological) by intake and observation. Intake form has been scanned into medical record. Patient has been instructed to contact their primary care physician regarding ROS issues if not already being addressed at this time.        Co-morbidities/Complexities (which will affect course of rehabilitation):   [x]None              Arthritic conditions   []Rheumatoid arthritis (M05.9)  []Osteoarthritis (M19.91)    Cardiovascular conditions   []Hypertension (I10)  []Hyperlipidemia (E78.5)  []Angina pectoris (I20)  []Atherosclerosis (I70)    Musculoskeletal conditions   []Disc pathology   []Congenital spine pathologies   []Prior surgical intervention  []Osteoporosis (M81.8)  []Osteopenia (M85.8)   Endocrine conditions   []Hypothyroid (E03.9)  []Hyperthyroid Gastrointestinal conditions   []Constipation (N31.39)    Metabolic conditions   []Morbid obesity (E66.01)  []Diabetes type 1(E10.65) or 2 (E11.65)   []Neuropathy (G60.9)      Pulmonary conditions   []Asthma (J45)  []Coughing   []COPD (J44.9)    Psychological Disorders  []Anxiety (F41.9)  []Depression (F32.9)   []Other:    []Other:            Barriers to/and or personal factors that will affect rehab potential:              []Age  []Sex              [x]Motivation/Lack of Motivation                        []Co-Morbidities              []Cognitive Function, education/learning barriers              []Environmental, home barriers              []profession/work barriers  []past PT/medical experience  []other:  Justification: pt is very motivated to remain active, maintain fitness and get better Falls Risk Assessment (30 days):   [x] Falls Risk assessed and no intervention required. [] Falls Risk assessed and Patient requires intervention due to being higher risk   TUG score (>12s at risk):     [] Falls education provided, including       ASSESSMENT:   Functional Impairments              []Noted spinal or UE joint hypomobility              []Noted spinal or UE joint hypermobility              [x]Decreased UE functional ROM              [x]Decreased UE functional strength              []Abnormal reflexes/sensation/myotomal/dermatomal deficits              []Decreased RC/scapular/core strength and neuromuscular control              []other:       Functional Activity Limitations (from functional questionnaire and intake)              [x]Reduced ability to tolerate prolonged functional positions              [x]Reduced ability or difficulty with changes of positions or transfers between positions              [x]Reduced ability to maintain good posture and demonstrate good body mechanics with sitting, bending, and lifting              [x] Reduced ability or tolerance with driving and/or computer work              [x]Reduced ability to sleep              [x]Reduced ability to perform lifting, reaching, carrying tasks              [x]Reduced ability to tolerate impact through UE              [x]Reduced ability to reach behind back              [x]Reduced ability to  or hold objects              [x]Reduced ability to throw or toss an object              []other:       Participation Restrictions              [x]Reduced participation in self care activities              [x]Reduced participation in home management activities              [x]Reduced participation in work activities              [x]Reduced participation in social activities. [x]Reduced participation in sport / recreational activities.      Classification: [x]Signs/symptoms consistent with post-surgical status including decreased ROM, strength and function.   []Signs/symptoms consistent with joint sprain/strain              []Signs/symptoms consistent with shoulder impingement              []Signs/symptoms consistent with shoulder/elbow/wrist tendinopathy              []Signs/symptoms consistent with Rotator cuff tear              []Signs/symptoms consistent with labral tear              []Signs/symptoms consistent with postural dysfunction                         []Signs/symptoms consistent with Glenohumeral IR Deficit - <45 degrees              []Signs/symptoms consistent with facet dysfunction of cervical/thoracic spine                         []Signs/symptoms consistent with pathology which may benefit from Dry needling                []other:      Prognosis/Rehab Potential:                                       []Excellent              [x]Good                 []Fair              []Poor     Tolerance of evaluation/treatment:               []Excellent              [x]Good                 []Fair              []Poor     Physical Therapy Evaluation Complexity Justification  [x] A history of present problem with:  [x] no personal factors and/or comorbidities that impact the plan of care;  []1-2 personal factors and/or comorbidities that impact the plan of care  []3 personal factors and/or comorbidities that impact the plan of care  [x] An examination of body systems using standardized tests and measures addressing any of the following: body structures and functions (impairments), activity limitations, and/or participation restrictions;:  [] a total of 1-2 or more elements   [] a total of 3 or more elements   [x] a total of 4 or more elements   [x] A clinical presentation with:  [x] stable and/or uncomplicated characteristics   [] evolving clinical presentation with changing characteristics  [] unstable and unpredictable characteristics; [x] Clinical decision making of [] low, [x] moderate, [] high complexity using standardized patient assessment instrument and/or measurable assessment of functional outcome. [x] EVAL (LOW) 37467 (typically 20 minutes face-to-face)  [] EVAL (MOD) 56532 (typically 30 minutes face-to-face)  [] EVAL (HIGH) 89371 (typically 45 minutes face-to-face)  [] RE-EVAL         PLAN:  Frequency/Duration:  1-2 days per week for 4-6 months:  INTERVENTIONS:  [x] Therapeutic exercise including: strength training, ROM, for Upper extremity and core   [x]  NMR activation and proprioception for UE, scap and Core   [x] Manual therapy as indicated for shoulder, scapula and spine to include: Dry Needling/IASTM, STM, PROM, Gr I-IV mobilizations, manipulation. [x] Modalities as needed that may include: thermal agents, E-stim, Biofeedback, US, iontophoresis as indicated  [x] Patient education on joint protection, postural re-education, activity modification, progression of HEP. Access Code: [email or web address]   URL: [email or web address]   Date: 12/30/2020   Prepared by: Linda Larson     Exercises   Seated Shoulder Shrugs - 10 reps - 3 sets - 3x daily - 7x weekly   Seated Scapular Retraction - 10 reps - 3 sets - 3x daily - 7x weekly   Supported Elbow Flexion Extension PROM - 10 reps - 3 sets - 3x daily - 7x weekly   Seated Gripping Towel - 10 reps - 3 sets - 3x daily - 7x weekly        GOALS:   Patient stated goal: get back to normal  [] Progressing: [] Met: [] Not Met: [] Adjusted    Therapist goals for Patient:   Short Term Goals: To be achieved in: 8 weeks 2/24/21  1. Independent in HEP and progression per patient tolerance, in order to prevent re-injury. [] Progressing: [] Met: [] Not Met: [] Adjusted   2. Patient will have a decrease in pain to facilitate improvement in movement, function, and ADLs as indicated by Functional Deficits.   [] Progressing: [] Met: [] Not Met: [] Adjusted 3. Patient will demonstrate PROM WFL. [] Progressing: [] Met: [] Not Met: [] Adjusted   4. Patient will tolerate initiation of strength program.    [] Progressing: [] Met: [] Not Met: [] Adjusted     Long Term Goals: To be achieved in: 24 weeks 6/16/21  1. Disability index score of 10% or less for the UEFS to assist with reaching prior level of function. [] Progressing: [] Met: [] Not Met: [] Adjusted  2. Patient will demonstrate increased AROM to WNL to allow for proper joint functioning as indicated by patients Functional Deficits. [] Progressing: [] Met: [] Not Met: [] Adjusted  3. Patient will demonstrate an increase in RUE strength to 4+/5 or greater to allow for proper functional mobility as indicated by patients Functional Deficits. [] Progressing: [] Met: [] Not Met: [] Adjusted  4. Patient will return to ADLs, IADLs and functional activities without increased symptoms or restriction. [] Progressing: [] Met: [] Not Met: [] Adjusted  5. Patient will tolerate progressive return to golf. [] Progressing: [] Met: [] Not Met: [] Adjusted    Electronically signed by:  Za Rehman PT, DPT, [facility]  Physical Therapist  [id number]  [email or web address]      Note: If patient does not return for scheduled/ recommended follow up visits, this note will serve as a discharge from care along with most recent update on progress.

## 2020-12-30 NOTE — OP NOTE
[facility]                     [address] [facility], [address] [facility]                                OPERATIVE REPORT    PATIENT NAME: Zee Dunn                  :        1955  MED REC NO:   [id number]                          ROOM:  ACCOUNT NO:   [de-identified]                           ADMIT DATE: 2020  PROVIDER:     Hakan Lei MD    DATE OF PROCEDURE:  2020    PREOPERATIVE DIAGNOSES:  Right shoulder AC arthrosis, supraspinatus  tear, SLAP tear. POSTOPERATIVE DIAGNOSES:  Right shoulder AC arthrosis, supraspinatus  tear, SLAP tear. OPERATION PERFORMED:  Right shoulder arthroscopy with subacromial  decompression, distal clavicle excision, rotator cuff repair, and open  biceps tenodesis. SURGEON:  Hakan Lei MD    ASSISTANT:  Cayden Barber DO    INDICATION:  This is a 51-year-old male, who had pain in the right  shoulder. He was evaluated and had a tender AC joint with positive  cross-arm test and positive impingement signs, had some weakness with  external rotation. He did have a subacromial injection and AC injection  and went through physical therapy, but continued to have discomfort. An  MRI was ultimately obtained, which showed a full thickness supraspinatus  tear of about 1.5 x 1.5 cm and has AC arthrosis and possibly a SLAP  tear. So after discussion of risks, benefits, and alternatives, the  patient wished to proceed with surgery. OPERATIVE PROCEDURE:  The patient was transported to the operating room. After interscalene block was placed, general anesthesia was induced. The patient was placed in the beach chair position. The arm was prepped  and draped in a sterile fashion. A straight posterior puncture was made  and an anterior working portal was created. There was a SLAP tear  superiorly.   The labrum was debrided in this area and biceps tenotomy was performed at the end of the case, just before the open biceps  tenotomy. Subscap was in good shape. There was a full thickness  crescent shaped tear of the supraspinatus. Infraspinatus was intact. No loose bodies in the axillary pouch. Posterior, inferior, and  anterior labrum was intact. Subacromial space was entered through both  posterior and lateral portals and subacromial bursectomy was performed. The footprint on a greater tuberosity was freshened with a renan. Two  double-loaded 9.03 Healicoil anchors were placed, one more posterior and  one more anterior, so only three of the four sutures needed to be used  in a horizontal mattress fashion. This was tied posterior to anterior  with the arm at the side, that easily brought the edge of the cuff to  the lateral edge of the footprint. A single SwiveLock was used to mimic  a two-row repair. Next, a subacromial decompression was performed. The largest osteophyte  was actually in the inferior [facility] joint. This was smoothed until the  anterior portal of the distal 5 or 6 mm of clavicle was taken top to  bottom and front to back. At this point, the biceps tenotomy was  performed and spinal needle was placed through the biceps. Then a  vertical incision was made along the deltoid fibers. Deltoid fibers  were split. Bicipital groove was opened. The biceps tendon was  delivered. FiberLoop suture was placed with four throws and then about  1.5 cm of tendon was resected. This was sized at a size 7. A 7-mm hole  was drilled and a 7 x 10 mm biceps tenodesis screw was placed and then  suture tied through the _____ of the screw and over the top of the  screw. This was buried in the depths of the bicipital groove. The  transverse humeral ligament had also been divided. Everything was  thoroughly irrigated.   Deltoid fascia was closed with 2-0 Vicryl,  subcutaneous tissue with 2-0 Vicryl, skin with 4-0 Monocryl, portals with 4-0 Monocryl, all were Steri-Stripped. The patient was placed in  an UltraSling. The patient tolerated this procedure well. There were  no known complications. Estimated blood loss was 25 mL. He was  returned to the recovery area in stable condition.         Ventura Matos MD    D: 12/29/2020 15:34:49       T: 12/29/2020 22:26:24     [id number]  Job#: [id number]     Doc#: [id number]

## 2020-12-30 NOTE — PROGRESS NOTES
Patient returns today 1 day status post right shoulder subacromial decompression, distal clavicle excision, biceps tenodesis, and rotator cuff repair. He is doing well. ROS: Pertinent items are noted in HPI. No notes on file    Past Medical History:  No date: Crohn disease ([address]  No date: Hyperlipidemia     Past Surgical History:  No date: COLONOSCOPY  12/29/2020: SHOULDER ARTHROSCOPY; Right      Comment:  RIGHT SHOULDER ARTHROSCOPY, SUBACROMIAL DECOMPRESSION,                DISTAL CLAVICLE EXCISION,ROTATOR CUFF REPAIR AND OPEN                BICEPS TENODESIS  RIGHT [id number], [id number], [id number],                54436) - RIGHT INTERSCALENE BLOCK performed by Joshua Whitman MD at [address]  No date: SMALL INTESTINE SURGERY    No family history on file.       Social History    Socioeconomic History      Marital status:       Spouse name: None      Number of children: None      Years of education: None      Highest education level: None    Occupational History      None    Social Needs      Financial resource strain: None      Food insecurity        Worry: None        Inability: None      Transportation needs        Medical: None        Non-medical: None    Tobacco Use      Smoking status: Never Smoker      Smokeless tobacco: Never Used    Substance and Sexual Activity      Alcohol use: Yes        Comment: 3-5/ WEEK      Drug use: Never      Sexual activity: None    Lifestyle      Physical activity        Days per week: None        Minutes per session: None      Stress: None    Relationships      Social connections        Talks on phone: None        Gets together: None        Attends Oriental orthodox service: None        Active member of club or organization: None        Attends meetings of clubs or organizations: None        Relationship status: None      Intimate partner violence        Fear of current or ex partner: None        Emotionally abused: None        Physically abused: None Forced sexual activity: None    Other Topics      Concerns:        None    Social History Narrative      None      Current Outpatient Medications:    PERCOCET 5-325 MG per tablet, Take 1-2 tablets by mouth every 4 hours as needed for Pain for up to 7 days. 1-2 tablets every 4 hours as needed for pain, Disp: 40 tablet, Rfl: 0    Multiple Vitamins-Minerals (PRESERVISION AREDS PO), Take by mouth 2 times daily, Disp: , Rfl:     Multiple Vitamins-Minerals (CENTRUM SILVER) TABS, Take 1 tablet by mouth daily , Disp: , Rfl:     VASCEPA 1 g CAPS capsule, TAKE 2 CAPSULES BY MOUTH TWICE A DAY, Disp: , Rfl: 9    No current facility-administered medications for this visit. No Known Allergies    VITAL SIGNS:  Temp 98.6 °F (37 °C)   Ht 6' (1.829 m)   Wt 188 lb (85.3 kg)   BMI 25.50 kg/m²   His incisions and portals are clean and dry. He is performing therapy without problems. He has good pain control. We showed him his pictures and discussed what we did. He will not be allowed active range of motion for 8 weeks. He is to call for any problems otherwise we will see him back in a month.

## 2020-12-30 NOTE — FLOWSHEET NOTE
[facility]  Orthopaedics and Sports Rehabilitation, [address]    Physical Therapy Daily Treatment Note  Date:  2020    Patient Name:  Bud Howard    :  1955  MRN: [id number]  Restrictions/Precautions:    Medical/Treatment Diagnosis Information:  · Diagnosis: M75.121 (ICD-10-CM) - Nontraumatic complete tear of right rotator cuff  · s/p R Right shoulder arthroscopy with subacromial decompression, distal clavicle excision, rotator cuff repair, and open biceps tenodesis DOS 20  · Treatment Diagnosis: M25.511, O75.878, F69.6  Insurance/Certification information:  PT Insurance Information: PT BENEFITS  FAILITY/ UHC/ EFFECTIVE 20/ ACTIVE/ DED 3000 MET 1020/ PAYS 80%/ 20 VPCY HARD MAX/ ALLCODES BILLABLE/ 0 AUTH/ ACCESS DID NOT GET NAME/ REF# [id number]/ ACCESS CALLED FOR BENEFITS /20 PAG  Physician Information:  Referring Practitioner: Eulalia Whalen.  Jeanine Barrientos MD  Has the plan of care been signed (Y/N):        []  Yes  [x]  No     Date of Patient follow up with Physician: 1 mo po      Is this a Progress Report:     []  Yes  [x]  No        If Yes:  Date Range for reporting period:  Beginning  Ending    Progress report will be due (10 Rx or 30 days whichever is less): 3/42/82       Recertification will be due (POC Duration  / 90 days whichever is less): 3/30/21          Visit # Insurance Allowable Auth Required   1 20 []  Yes []  No        Functional Scale:    Date assessed:  0=0% (100% deficit)   21     Latex Allergy:  [x]NO      []YES  Preferred Language for Healthcare:   [x]English       []other:    Pain level:  9/10     SUBJECTIVE:  See eval    OBJECTIVE:       Not assessed d/t recent surgery          ROM PROM AROM  Comment     L R L R     Flexion             Abduction             ER             IR                Not assessed d/t recent surgery   Strength L R Comment   Flexion         Abduction         ER         IR         Upper Trap         Lower Trap       Mid Trap         Biceps         Triceps            Not assessed d/t recent surgery  Special Tests Results/Comment   Margo     Ciro     Fawn     OBriens     Apprehension      Load & Shift            Reflexes/Sensation:               [x]? Dermatomes/Myotomes intact myotomes not assessed              []? Reflexes equal and normal bilaterally               []? Other:     Joint mobility: Not assessed d/t recent surgery              []? Normal               []? Hypo              []? Hyper     Palpation: generalized TTP over surgical sites     Functional Mobility/Transfers: decreased use of RUE d/t surgery. + ultrasling     Posture: WNL     Bandages/Dressings/Incisions:   12/30/20 post op dressings removed in clinic, incisions clean and dry. Small area of redness at base of open incision, will continue to monitor.        RESTRICTIONS/PRECAUTIONS: PROM x 6 weeks, strength at 8 weeks    Exercises/Interventions:   Exercises:  Exercise/Equipment Resistance/Repetitions Other comments   Stretching/PROM     Wand     Table Slides     UE [place]     Pulleys     Pendulum          Isometrics     Retraction      HEP    Weight shift     Flexion     Abduction     External Rotation     Internal Rotation     Biceps     Triceps          PRE's     Flexion     Abduction     External Rotation     Internal Rotation     Shrugs x30    EXT     Reverse Flys     Serratus     Horizontal Abd with ER     Biceps x30 PROM    Triceps     Retraction x30         Cable Column/Theraband     External Rotation     Internal Rotation     Shrugs     Lats     Ext     Flex     Scapular Retraction     BIC     TRIC     PNF          Dynamic Stability          Plyoback          Manual interventions                 Plan for next session: flexion forward lean, seated ER with wand    Therapeutic Exercise and NMR EXR [x] (07671) Provided verbal/tactile cueing for activities related to strengthening, flexibility, endurance, ROM  for improvements in scapular, scapulothoracic and UE control with self care, reaching, carrying, lifting, house/yardwork, driving/computer work.    [] (89645) Provided verbal/tactile cueing for activities related to improving balance, coordination, kinesthetic sense, posture, motor skill, proprioception  to assist with  scapular, scapulothoracic and UE control with self care, reaching, carrying, lifting, house/yardwork, driving/computer work. Therapeutic Activities:    [] (17379 or 11620) Provided verbal/tactile cueing for activities related to improving balance, coordination, kinesthetic sense, posture, motor skill, proprioception and motor activation to allow for proper function of scapular, scapulothoracic and UE control with self care, carrying, lifting, driving/computer work. Home Exercise Program:   Access Code: [email or web address]   URL: [email or web address]   Date: 12/30/2020   Prepared by: Elsie Kruger     Exercises   · Seated Shoulder Shrugs - 10 reps - 3 sets - 3x daily - 7x weekly   · Seated Scapular Retraction - 10 reps - 3 sets - 3x daily - 7x weekly   · Supported Elbow Flexion Extension PROM - 10 reps - 3 sets - 3x daily - 7x weekly   · Seated Gripping Towel - 10 reps - 3 sets - 3x daily - 7x weekly      [x] (57013) Reviewed/Progressed HEP activities related to strengthening, flexibility, endurance, ROM of scapular, scapulothoracic and UE control with self care, reaching, carrying, lifting, house/yardwork, driving/computer work  [] (89589) Reviewed/Progressed HEP activities related to improving balance, coordination, kinesthetic sense, posture, motor skill, proprioception of scapular, scapulothoracic and UE control with self care, reaching, carrying, lifting, house/yardwork, driving/computer work      Manual Treatments: 2. Patient will demonstrate increased AROM to WNL to allow for proper joint functioning as indicated by patients Functional Deficits. []? Progressing: []? Met: []? Not Met: []? Adjusted  3. Patient will demonstrate an increase in RUE strength to 4+/5 or greater to allow for proper functional mobility as indicated by patients Functional Deficits. []? Progressing: []? Met: []? Not Met: []? Adjusted  4. Patient will return to ADLs, IADLs and functional activities without increased symptoms or restriction. []? Progressing: []? Met: []? Not Met: []? Adjusted  5. Patient will tolerate progressive return to golf. []? Progressing: []? Met: []? Not Met: []? Adjusted    Overall Progression Towards Functional goals/ Treatment Progress Update:  [] Patient is progressing as expected towards functional goals listed. [] Progression is slowed due to complexities/Impairments listed. [] Progression has been slowed due to co-morbidities. [x] Plan just implemented, too soon to assess goals progression <30days   [] Goals require adjustment due to lack of progress  [] Patient is not progressing as expected and requires additional follow up with physician  [] Other    Prognosis for POC: [x] Good [] Fair  [] Poor      Patient requires continued skilled intervention: [x] Yes  [] No    Treatment/Activity Tolerance:  [x] Patient able to complete treatment  [] Patient limited by fatigue  [] Patient limited by pain     [] Patient limited by other medical complications  [] Other:                    PLAN: See eval  [] Continue per plan of care [] Alter current plan (see comments above)  [x] Plan of care initiated [] Hold pending MD visit [] Discharge      Electronically signed by:  Sneha Barnhart, PT, DPT, OCS  Physical Therapist  [id number]  [email or web address]      Note: If patient does not return for scheduled/ recommended follow up visits, this note will serve as a discharge from care along with most recent update on progress.

## 2021-01-07 ENCOUNTER — HOSPITAL ENCOUNTER (OUTPATIENT)
Dept: PHYSICAL THERAPY | Age: 66
Setting detail: THERAPIES SERIES
Discharge: HOME | End: 2021-01-07
Payer: COMMERCIAL

## 2021-01-07 PROCEDURE — 97110 THERAPEUTIC EXERCISES: CPT | Performed by: PHYSICAL THERAPIST

## 2021-01-07 PROCEDURE — 97140 MANUAL THERAPY 1/> REGIONS: CPT | Performed by: PHYSICAL THERAPIST

## 2021-01-07 NOTE — FLOWSHEET NOTE
The [address] [place] [address] and Sports [facility]    Physical Therapy Daily Treatment Note  Date:  2021    Patient Name:  Miri Tijerina    :  1955  MRN: [id number]  Restrictions/Precautions:    Medical/Treatment Diagnosis Information:  · Diagnosis: M75.121 (ICD-10-CM) - Nontraumatic complete tear of right rotator cuff  · s/p R Right shoulder arthroscopy with subacromial decompression, distal clavicle excision, rotator cuff repair, and open biceps tenodesis DOS 20  · Treatment Diagnosis: M25.511, I05.046, K88.6  Insurance/Certification information:  PT Insurance Information: PT BENEFITS  FAILITY/ UHC/ EFFECTIVE 20/ ACTIVE/ DED 3000 MET 1020/ PAYS 80%/ 20 VPCY HARD MAX/ ALLCODES BILLABLE/ 0 AUTH/ ACCESS DID NOT GET NAME/ REF# [id number]/ ACCESS CALLED FOR BENEFITS /20 PAG  Physician Information:  Referring Practitioner: Irwin Luu. Eliecer Mixon MD  Has the plan of care been signed (Y/N):        []  Yes  [x]  No     Date of Patient follow up with Physician: 1 mo po      Is this a Progress Report:     []  Yes  [x]  No        If Yes:  Date Range for reporting period:  Beginning  Ending    Progress report will be due (10 Rx or 30 days whichever is less): 3/39/88       Recertification will be due (POC Duration  / 90 days whichever is less): 3/30/21          Visit # Insurance Allowable Auth Required   1  (1 in 2020) 20 []  Yes []  No        Functional Scale:    Date assessed:  0=0% (100% deficit)   21     Latex Allergy:  [x]NO      []YES  Preferred Language for Healthcare:   [x]English       []other:    Pain level:  6/10 on average     SUBJECTIVE:  1.5 weeks po. Pt states that his shoulder has been sore. He has been trying to do some work on his computer and that has been very painful. Pt states that sleeping has been tough, he is still in a recliner and just can't seem to get comfortable. Despite pain he does feel like each day is a little bit better.  Pt is out of pain meds so he has been using as needed. OBJECTIVE:       Not assessed d/t recent surgery          ROM PROM AROM  Comment     L R L R     Flexion             Abduction             ER             IR                Not assessed d/t recent surgery   Strength L R Comment   Flexion         Abduction         ER         IR         Upper Trap         Lower Trap         Mid Trap         Biceps         Triceps            Not assessed d/t recent surgery  Special Tests Results/Comment   Margo Tanner     Speeds     OBriens     Apprehension      Load & Shift            Reflexes/Sensation:               [x]? Dermatomes/Myotomes intact myotomes not assessed              []? Reflexes equal and normal bilaterally               []? Other:     Joint mobility: Not assessed d/t recent surgery              []? Normal               []? Hypo              []? Hyper     Palpation: generalized TTP over surgical sites     Functional Mobility/Transfers: decreased use of RUE d/t surgery. + ultrasling     Posture: WNL     Bandages/Dressings/Incisions:   12/30/20 post op dressings removed in clinic, incisions clean and dry. Small area of redness at base of open incision, will continue to monitor. 1/7/21 sutures removed in clinic, incisions clean and dry, healing well.        RESTRICTIONS/PRECAUTIONS: PROM x 6 weeks, strength at 8 weeks    Exercises/Interventions:   Exercises:  Exercise/Equipment Resistance/Repetitions Other comments   Stretching/PROM     Wand     Table Slides     UE [place]     Pulleys     Pendulum     Forward lean  10x10\" in sling         Isometrics     Retraction      HEP    Weight shift     Flexion     Abduction     External Rotation     Internal Rotation     Biceps     Triceps          PRE's     Flexion     Abduction     External Rotation     Internal Rotation     Shrugs x30    EXT     Reverse Flys     Serratus     Horizontal Abd with ER     Biceps x30 PROM    Triceps     Retraction x30         Cable Column/Theraband     External Rotation     Internal Rotation     Shrugs     Lats     Ext     Flex     Scapular Retraction     BIC     TRIC     PNF          Dynamic Stability          Plyoback          Manual interventions     STM 8' infraspinatus           Plan for next session: table slides, seated ER with wand    Therapeutic Exercise and NMR EXR  [x] (21648) Provided verbal/tactile cueing for activities related to strengthening, flexibility, endurance, ROM  for improvements in scapular, scapulothoracic and UE control with self care, reaching, carrying, lifting, house/yardwork, driving/computer work.    [] (29943) Provided verbal/tactile cueing for activities related to improving balance, coordination, kinesthetic sense, posture, motor skill, proprioception  to assist with  scapular, scapulothoracic and UE control with self care, reaching, carrying, lifting, house/yardwork, driving/computer work. Therapeutic Activities:    [] (17003 or 51541) Provided verbal/tactile cueing for activities related to improving balance, coordination, kinesthetic sense, posture, motor skill, proprioception and motor activation to allow for proper function of scapular, scapulothoracic and UE control with self care, carrying, lifting, driving/computer work. Home Exercise Program:   Access Code: [email or web address]   URL: [email or web address]   Date: 12/30/2020   Prepared by: Ronaldo Verdugo     Exercises   · Seated Shoulder Shrugs - 10 reps - 3 sets - 3x daily - 7x weekly   · Seated Scapular Retraction - 10 reps - 3 sets - 3x daily - 7x weekly   · Supported Elbow Flexion Extension PROM - 10 reps - 3 sets - 3x daily - 7x weekly   · Seated Gripping Towel - 10 reps - 3 sets - 3x daily - 7x weekly      [x] (41543) Reviewed/Progressed HEP activities related to strengthening, flexibility, endurance, ROM of scapular, scapulothoracic and UE control with self care, reaching, carrying, lifting, house/yardwork, driving/computer work  [] (09043) Reviewed/Progressed HEP activities related to improving balance, coordination, kinesthetic sense, posture, motor skill, proprioception of scapular, scapulothoracic and UE control with self care, reaching, carrying, lifting, house/yardwork, driving/computer work      Manual Treatments:    [] (84492) Provided manual therapy to mobilize soft tissue/joints of cervical/CT, scapular GHJ and UE for the purpose of modulating pain, promoting relaxation,  increasing ROM, reducing/eliminating soft tissue swelling/inflammation/restriction, improving soft tissue extensibility and allowing for proper ROM for normal function with self care, reaching, carrying, lifting, house/yardwork, driving/computer work    Modalities: Will ice at home    Charges:  Timed Code Treatment Minutes: 37   Total Treatment Minutes: 37   Time in: 9:30  Time out: 10:07    [] EVAL (LOW) 08921 (typically 20 minutes face-to-face)  [] EVAL (MOD) 21413 (typically 30 minutes face-to-face)  [] EVAL (HIGH) 64289 (typically 45 minutes face-to-face)  [] RE-EVAL     [x] ND(69993) x 1    [] IONTO  [] NMR (70063) x     [] VASO  [x] Manual (29915) x 1     [] Other:  [] TA x      [] Mech Traction (80124)  [] ES(attended) (36866)      [] ES (un) (44790):     GOALS:  Patient stated goal: get back to normal  []? Progressing: []? Met: []? Not Met: []? Adjusted     Therapist goals for Patient:   Short Term Goals: To be achieved in: 8 weeks 2/24/21  1. Independent in HEP and progression per patient tolerance, in order to prevent re-injury. []? Progressing: []? Met: []? Not Met: []? Adjusted   2. Patient will have a decrease in pain to facilitate improvement in movement, function, and ADLs as indicated by Functional Deficits. []? Progressing: []? Met: []? Not Met: []? Adjusted   3. Patient will demonstrate PROM WFL. []? Progressing: []? Met: []? Not Met: []? Adjusted   4. Patient will tolerate initiation of strength program.    []? Progressing: []? Met: []?  Not Met: []? Progressions limited d/t surgical restrictions. Pt requires PT follow up to address ROM, strength and functional mobility deficits. PLAN: See eval  [x] Continue per plan of care [] Alter current plan (see comments above)  [] Plan of care initiated [] Hold pending MD visit [] Discharge      Electronically signed by:  Alessandra Quevedo, PT, DPT, OCS  Physical Therapist  [id number]  [email or web address]      Note: If patient does not return for scheduled/ recommended follow up visits, this note will serve as a discharge from care along with most recent update on progress.

## 2021-01-14 ENCOUNTER — HOSPITAL ENCOUNTER (OUTPATIENT)
Dept: PHYSICAL THERAPY | Age: 66
Setting detail: THERAPIES SERIES
Discharge: HOME | End: 2021-01-14
Payer: COMMERCIAL

## 2021-01-14 PROCEDURE — 97110 THERAPEUTIC EXERCISES: CPT | Performed by: PHYSICAL THERAPIST

## 2021-01-14 NOTE — FLOWSHEET NOTE
[facility]  Orthopaedics and Sports Rehabilitation, [facility]    Physical Therapy Daily Treatment Note  Date:  2021    Patient Name:  Loli Noel    :  1955  MRN: [id number]  Restrictions/Precautions:    Medical/Treatment Diagnosis Information:  · Diagnosis: M75.121 (ICD-10-CM) - Nontraumatic complete tear of right rotator cuff  · s/p R Right shoulder arthroscopy with subacromial decompression, distal clavicle excision, rotator cuff repair, and open biceps tenodesis DOS 20  · Treatment Diagnosis: M25.511, P44.342, V99.6  Insurance/Certification information:  PT Insurance Information: PT BENEFITS  FAILITY/ UHC/ EFFECTIVE 20/ ACTIVE/ DED 3000 MET 1020/ PAYS 80%/ 20 VPCY HARD MAX/ ALLCODES BILLABLE/ 0 AUTH/ ACCESS DID NOT GET NAME/ REF# [id number]/ ACCESS CALLED FOR BENEFITS /20 PAG  Physician Information:  Referring Practitioner: Nighat Douglas. Darius Pocne MD  Has the plan of care been signed (Y/N):        []  Yes  [x]  No     Date of Patient follow up with Physician: 1 mo po      Is this a Progress Report:     []  Yes  [x]  No        If Yes:  Date Range for reporting period:  Beginning  Ending    Progress report will be due (10 Rx or 30 days whichever is less): 97       Recertification will be due (POC Duration  / 90 days whichever is less): 3/30/21          Visit # Insurance Allowable Auth Required   2  (1 in ) 20 []  Yes []  No        Functional Scale:    Date assessed:  080=0% (100% deficit)   21     Latex Allergy:  [x]NO      []YES  Preferred Language for Healthcare:   [x]English       []other:    Pain level:  5/10 on average     SUBJECTIVE:  2.5 weeks po. Pt states that he still is having trouble sleeping, is still sleeping in the recliner. Has been taking tylenol as needed. States that occasionally he moves his shoulder accidentally and it is painful.       OBJECTIVE:              ROM PROM AROM  Comment     L SHAD KULKARNI   Flexion    92 table slides         Abduction    105 table slides         ER    25 seated         IR                Not assessed d/t recent surgery   Strength L R Comment   Flexion         Abduction         ER         IR         Upper Trap         Lower Trap         Mid Trap         Biceps         Triceps            Not assessed d/t recent surgery  Special Tests Results/Comment   Margo Augustine     OBriens     Apprehension      Load & Shift            Reflexes/Sensation:               [x]? Dermatomes/Myotomes intact myotomes not assessed              []? Reflexes equal and normal bilaterally               []? Other:     Joint mobility: Not assessed d/t recent surgery              []? Normal               []? Hypo              []? Hyper     Palpation: generalized TTP over surgical sites     Functional Mobility/Transfers: decreased use of RUE d/t surgery. + ultrasling     Posture: WNL     Bandages/Dressings/Incisions:   12/30/20 post op dressings removed in clinic, incisions clean and dry. Small area of redness at base of open incision, will continue to monitor. 1/7/21 sutures removed in clinic, incisions clean and dry, healing well.        RESTRICTIONS/PRECAUTIONS: PROM x 6 weeks, strength at 8 weeks    Exercises/Interventions:   Exercises:  Exercise/Equipment Resistance/Repetitions Other comments   Stretching/PROM     Wand     Table Slides 10x10\" flex/ABD    UE [place]     Pulleys     Pendulum     Forward lean      Seated ER 10x10\"  With wand        Isometrics     Retraction      HEP    Weight shift     Flexion     Abduction     External Rotation     Internal Rotation     Biceps     Triceps          PRE's     Flexion     Abduction     External Rotation     Internal Rotation     Shrugs x30    EXT     Reverse Flys     Serratus     Horizontal Abd with ER     Biceps x30 PROM    Triceps     Retraction x30         Cable Column/Theraband     External Rotation     Internal Rotation     Shrugs     Lats Ext     Flex     Scapular Retraction     BIC     TRIC     PNF          Dynamic Stability          Plyoback          Manual interventions     STM            Plan for next session: progress as tolerated within MD protocol    Therapeutic Exercise and NMR EXR  [x] (86997) Provided verbal/tactile cueing for activities related to strengthening, flexibility, endurance, ROM  for improvements in scapular, scapulothoracic and UE control with self care, reaching, carrying, lifting, house/yardwork, driving/computer work.    [] (07154) Provided verbal/tactile cueing for activities related to improving balance, coordination, kinesthetic sense, posture, motor skill, proprioception  to assist with  scapular, scapulothoracic and UE control with self care, reaching, carrying, lifting, house/yardwork, driving/computer work. Therapeutic Activities:    [] (91681 or 27064) Provided verbal/tactile cueing for activities related to improving balance, coordination, kinesthetic sense, posture, motor skill, proprioception and motor activation to allow for proper function of scapular, scapulothoracic and UE control with self care, carrying, lifting, driving/computer work. Home Exercise Program:   Access Code: [email or web address]   URL: [employer]/   Date: 12/30/2020   Prepared by: Clarita Rowland     Updated 1/14/21. Pt given printed handout.   Exercises   Seated Shoulder Flexion Towel Slide at Table Top - 10 reps - 1 sets - 10 hold - 3x daily - 7x weekly   Seated Shoulder Abduction Towel Slide at Table Top - 10 reps - 1 sets - 10 hold - 3x daily - 7x weekly   Seated Shoulder External Rotation AAROM with Dowel - 10 reps - 1 sets - 10 hold - 3x daily - 7x weekly   Seated Shoulder Shrugs - 10 reps - 3 sets - 3x daily - 7x weekly   Seated Scapular Retraction - 10 reps - 3 sets - 3x daily - 7x weekly   Supported Elbow Flexion Extension PROM - 10 reps - 3 sets - 3x daily - 7x weekly 2. Patient will have a decrease in pain to facilitate improvement in movement, function, and ADLs as indicated by Functional Deficits. []? Progressing: []? Met: []? Not Met: []? Adjusted   3. Patient will demonstrate PROM WFL. []? Progressing: []? Met: []? Not Met: []? Adjusted   4. Patient will tolerate initiation of strength program.    []? Progressing: []? Met: []? Not Met: []? Adjusted      Long Term Goals: To be achieved in: 24 weeks 6/16/21  1. Disability index score of 10% or less for the UEFS to assist with reaching prior level of function. []? Progressing: []? Met: []? Not Met: []? Adjusted  2. Patient will demonstrate increased AROM to WNL to allow for proper joint functioning as indicated by patients Functional Deficits. []? Progressing: []? Met: []? Not Met: []? Adjusted  3. Patient will demonstrate an increase in RUE strength to 4+/5 or greater to allow for proper functional mobility as indicated by patients Functional Deficits. []? Progressing: []? Met: []? Not Met: []? Adjusted  4. Patient will return to ADLs, IADLs and functional activities without increased symptoms or restriction. []? Progressing: []? Met: []? Not Met: []? Adjusted  5. Patient will tolerate progressive return to golf. []? Progressing: []? Met: []? Not Met: []? Adjusted    Overall Progression Towards Functional goals/ Treatment Progress Update:  [x] Patient is progressing as expected towards functional goals listed. [] Progression is slowed due to complexities/Impairments listed. [] Progression has been slowed due to co-morbidities.   [] Plan just implemented, too soon to assess goals progression <30days   [] Goals require adjustment due to lack of progress  [] Patient is not progressing as expected and requires additional follow up with physician  [] Other    Prognosis for POC: [x] Good [] Fair  [] Poor      Patient requires continued skilled intervention: [x] Yes  [] No    Treatment/Activity Tolerance: [x] Patient able to complete treatment  [] Patient limited by fatigue  [] Patient limited by pain     [] Patient limited by other medical complications  [] Other: Pt tolerated addition of table slides into flexion and ABD, stated that he had some pain with movements at anterior shoulder, stated ABD was easier than flexion. Pt tolerated ER PROM with wand, pain reached 7/10 with movement. Educated patient to not let pain reach 2-3 grades higher than resting pain on the 0-10 pain scale. Pt experienced slight pain in biceps at end range elbow flexion during PROM. Progressions limited d/t surgical restrictions. Pt requires PT follow up to address ROM, strength and functional mobility deficits. PLAN: See eval  [x] Continue per plan of care [] Alter current plan (see comments above)  [] Plan of care initiated [] Hold pending MD visit [] Discharge      Electronically signed by:  Margaux Wood, PT, DPT, OCS  Physical Therapist  [id number]  [email or web address]    Su Pisano, SPT  Therapist was present, directed the patient's care, made skilled judgement, and was responsible for assessment and treatment of the patient. Note: If patient does not return for scheduled/ recommended follow up visits, this note will serve as a discharge from care along with most recent update on progress.

## 2021-01-21 ENCOUNTER — HOSPITAL ENCOUNTER (OUTPATIENT)
Dept: PHYSICAL THERAPY | Age: 66
Setting detail: THERAPIES SERIES
Discharge: HOME | End: 2021-01-21
Payer: COMMERCIAL

## 2021-01-21 PROCEDURE — 97110 THERAPEUTIC EXERCISES: CPT | Performed by: PHYSICAL THERAPIST

## 2021-01-21 NOTE — FLOWSHEET NOTE
[facility]    [facility] Daily Treatment Note  Date:  2021    Patient Name:  Naila Mena    :  1955  MRN: [id number]  Restrictions/Precautions:    Medical/Treatment Diagnosis Information:  · Diagnosis: M75.121 (ICD-10-CM) - Nontraumatic complete tear of right rotator cuff  · s/p R Right shoulder arthroscopy with subacromial decompression, distal clavicle excision, rotator cuff repair, and open biceps tenodesis DOS 20  · Treatment Diagnosis: M25.511, W62.050, K96.7  Insurance/Certification information:  PT Insurance Information: PT BENEFITS  FAILITY/ UHC/ EFFECTIVE 20/ ACTIVE/ DED 3000 MET 1020/ PAYS 80%/ 20 VPCY HARD MAX/ ALLCODES BILLABLE/ 0 AUTH/ ACCESS DID NOT GET NAME/ REF# [id number]/ ACCESS CALLED FOR BENEFITS /20 PAG  Physician Information:  Referring Practitioner: Linsey Judge. Kay Leyden, MD  Has the plan of care been signed (Y/N):        []  Yes  [x]  No     Date of Patient follow up with Physician: 1 mo po      Is this a Progress Report:     []  Yes  [x]  No        If Yes:  Date Range for reporting period:  Beginning  Ending    Progress report will be due (10 Rx or 30 days whichever is less): 3/71/21       Recertification will be due (POC Duration  / 90 days whichever is less): 3/30/21          Visit # Insurance Allowable Auth Required   2  (1 in ) 20 []  Yes []  No        Functional Scale:    Date assessed:  0=0% (100% deficit)   21     Latex Allergy:  [x]NO      []YES  Preferred Language for Healthcare:   [x]English       []other:    Pain level:  5/10 on average     SUBJECTIVE:  3.5 weeks po. Pt states that he is now sleeping in his bed without his sling, is propping his arm up with pillows. Pt is still having trouble sleeping but it is gradually getting better.      OBJECTIVE:              ROM PROM AROM  Comment     L R L R     Flexion    109 table slides       Abduction    94 table slides         ER    34 supine         IR                Not assessed d/t recent surgery   Strength L R Comment   Flexion         Abduction         ER         IR         Upper Trap         Lower Trap         Mid Trap         Biceps         Triceps            Not assessed d/t recent surgery  Special Tests Results/Comment   Margo Augustine     OBriens     Apprehension      Load & Shift            Reflexes/Sensation:               [x]? Dermatomes/Myotomes intact myotomes not assessed              []? Reflexes equal and normal bilaterally               []? Other:     Joint mobility: Not assessed d/t recent surgery              []? Normal               []? Hypo              []? Hyper     Palpation: generalized TTP over surgical sites     Functional Mobility/Transfers: decreased use of RUE d/t surgery. + ultrasling     Posture: WNL     Bandages/Dressings/Incisions:   12/30/20 post op dressings removed in clinic, incisions clean and dry. Small area of redness at base of open incision, will continue to monitor. 1/7/21 sutures removed in clinic, incisions clean and dry, healing well.        RESTRICTIONS/PRECAUTIONS: PROM x 6 weeks, strength at 8 weeks    Exercises/Interventions:   Exercises:  Exercise/Equipment Resistance/Repetitions Other comments   Stretching/PROM     Wand     Table Slides 10x10\" flex/ABD    UE [place]     Pulleys     Pendulum     Forward lean      Seated ER  With wand   Supine ER 10x10\" With wand, 0 ABD        Isometrics     Retraction      HEP    Weight shift     Flexion     Abduction     External Rotation     Internal Rotation     Biceps     Triceps          PRE's     Flexion     Abduction     External Rotation     Internal Rotation     Shrugs x30    EXT     Reverse Flys     Serratus     Horizontal Abd with ER     Biceps x30 PROM    Triceps     Retraction x30         Cable Column/Theraband     External Rotation     Internal Rotation     Shrugs     Lats Ext     Flex     Scapular Retraction     BIC     TRIC     PNF          Dynamic Stability          Plyoback          Manual interventions     STM 3' infraspinatus           Plan for next session: progress as tolerated within MD protocol    Therapeutic Exercise and NMR EXR  [x] (89060) Provided verbal/tactile cueing for activities related to strengthening, flexibility, endurance, ROM  for improvements in scapular, scapulothoracic and UE control with self care, reaching, carrying, lifting, house/yardwork, driving/computer work.    [] (65847) Provided verbal/tactile cueing for activities related to improving balance, coordination, kinesthetic sense, posture, motor skill, proprioception  to assist with  scapular, scapulothoracic and UE control with self care, reaching, carrying, lifting, house/yardwork, driving/computer work. Therapeutic Activities:    [] (84720 or 07963) Provided verbal/tactile cueing for activities related to improving balance, coordination, kinesthetic sense, posture, motor skill, proprioception and motor activation to allow for proper function of scapular, scapulothoracic and UE control with self care, carrying, lifting, driving/computer work. Home Exercise Program:   Access Code: [email or web address]   URL: [employer]/   Date: 12/30/2020   Prepared by: Mateo Salazar     Updated 1/14/21. Pt given printed handout.   Exercises   Seated Shoulder Flexion Towel Slide at Table Top - 10 reps - 1 sets - 10 hold - 3x daily - 7x weekly   Seated Shoulder Abduction Towel Slide at Table Top - 10 reps - 1 sets - 10 hold - 3x daily - 7x weekly   Seated Shoulder External Rotation AAROM with Dowel - 10 reps - 1 sets - 10 hold - 3x daily - 7x weekly   Seated Shoulder Shrugs - 10 reps - 3 sets - 3x daily - 7x weekly   Seated Scapular Retraction - 10 reps - 3 sets - 3x daily - 7x weekly   Supported Elbow Flexion Extension PROM - 10 reps - 3 sets - 3x daily - 7x weekly 2. Patient will have a decrease in pain to facilitate improvement in movement, function, and ADLs as indicated by Functional Deficits. []? Progressing: []? Met: []? Not Met: []? Adjusted   3. Patient will demonstrate PROM WFL. []? Progressing: []? Met: []? Not Met: []? Adjusted   4. Patient will tolerate initiation of strength program.    []? Progressing: []? Met: []? Not Met: []? Adjusted      Long Term Goals: To be achieved in: 24 weeks 6/16/21  1. Disability index score of 10% or less for the UEFS to assist with reaching prior level of function. []? Progressing: []? Met: []? Not Met: []? Adjusted  2. Patient will demonstrate increased AROM to WNL to allow for proper joint functioning as indicated by patients Functional Deficits. []? Progressing: []? Met: []? Not Met: []? Adjusted  3. Patient will demonstrate an increase in RUE strength to 4+/5 or greater to allow for proper functional mobility as indicated by patients Functional Deficits. []? Progressing: []? Met: []? Not Met: []? Adjusted  4. Patient will return to ADLs, IADLs and functional activities without increased symptoms or restriction. []? Progressing: []? Met: []? Not Met: []? Adjusted  5. Patient will tolerate progressive return to golf. []? Progressing: []? Met: []? Not Met: []? Adjusted    Overall Progression Towards Functional goals/ Treatment Progress Update:  [x] Patient is progressing as expected towards functional goals listed. [] Progression is slowed due to complexities/Impairments listed. [] Progression has been slowed due to co-morbidities.   [] Plan just implemented, too soon to assess goals progression <30days   [] Goals require adjustment due to lack of progress  [] Patient is not progressing as expected and requires additional follow up with physician  [] Other    Prognosis for POC: [x] Good [] Fair  [] Poor      Patient requires continued skilled intervention: [x] Yes  [] No    Treatment/Activity Tolerance: [x] Patient able to complete treatment  [] Patient limited by fatigue  [] Patient limited by pain     [] Patient limited by other medical complications  [] Other: Pt noted TTP through infraspinatus, tolerated soft tissue massage well, stated that it felt better afterwards. Pt demonstrated an increase in PROM during table slides in flexion; ABD slightly decreased compared to LPV. Pain noted with supine ER, stated that it felt similar to seated ER. No pain experienced with shrugs and seated scap retraction, did get some minimal pain in bicep with passive elbow flexion. Pt tended to activate bicep throughout treatment, emphasized surgical precautions to avoid active elbow flexion and to use other arm to bend R elbow. Progressions limited d/t surgical restrictions. Pt requires PT follow up to address ROM, strength and functional mobility deficits. PLAN: See eval  [x] Continue per plan of care [] Alter current plan (see comments above)  [] Plan of care initiated [] Hold pending MD visit [] Discharge      Electronically signed by:  Fortino Mosquera, PT, DPT, OCS  Physical Therapist  [id number]  [email or web address]    Jesus Dominguez, [facility]  Therapist was present, directed the patient's care, made skilled judgement, and was responsible for assessment and treatment of the patient. Note: If patient does not return for scheduled/ recommended follow up visits, this note will serve as a discharge from care along with most recent update on progress.

## 2021-01-29 ENCOUNTER — HOSPITAL ENCOUNTER (OUTPATIENT)
Dept: PHYSICAL THERAPY | Age: 66
Setting detail: THERAPIES SERIES
Discharge: HOME | End: 2021-01-29
Payer: COMMERCIAL

## 2021-01-29 PROCEDURE — 97110 THERAPEUTIC EXERCISES: CPT | Performed by: SPECIALIST/TECHNOLOGIST

## 2021-01-29 PROCEDURE — 97140 MANUAL THERAPY 1/> REGIONS: CPT | Performed by: SPECIALIST/TECHNOLOGIST

## 2021-01-29 NOTE — FLOWSHEET NOTE
[facility] [employer]  Orthopaedics and Sports Rehabilitation, [facility]    Physical Therapy Daily Treatment Note  Date:  2021    Patient Name:  Patti Ayala    :  1955  MRN: [id number]  Restrictions/Precautions:    Medical/Treatment Diagnosis Information:  · Diagnosis: M75.121 (ICD-10-CM) - Nontraumatic complete tear of right rotator cuff  · s/p R Right shoulder arthroscopy with subacromial decompression, distal clavicle excision, rotator cuff repair, and open biceps tenodesis DOS 20  · Treatment Diagnosis: M25.511, L81.929, C37.6  Insurance/Certification information:  PT Insurance Information: PT BENEFITS  FAILITY/ UHC/ EFFECTIVE 20/ ACTIVE/ DED 3000 MET 1020/ PAYS 80%/ 20 VPCY HARD MAX/ ALLCODES BILLABLE/ 0 AUTH/ ACCESS DID NOT GET NAME/ REF# [id number]/ ACCESS CALLED FOR BENEFITS /20 PAG  Physician Information:  Referring Practitioner: La Lam. Guero Schneider MD  Has the plan of care been signed (Y/N):        []  Yes  [x]  No     Date of Patient follow up with Physician: 1 mo po      Is this a Progress Report:     []  Yes  [x]  No        If Yes:  Date Range for reporting period:  Beginning  Ending    Progress report will be due (10 Rx or 30 days whichever is less): 52       Recertification will be due (POC Duration  / 90 days whichever is less): 3/30/21          Visit # Insurance Allowable Auth Required   3  (2 in ) 20 []  Yes []  No        Functional Scale:    Date assessed:  080=0% (100% deficit)   21     Latex Allergy:  [x]NO      []YES  Preferred Language for Healthcare:   [x]English       []other:    Pain level:  2.5/10 on average     SUBJECTIVE:  Pt notes his shoulder is feeling better. Notes he has been careful with his precautions.  (21)  4 weeks po. Pt states that he is now sleeping in his bed without his sling, is propping his arm up with pillows. Pt is still having trouble sleeping but it is gradually getting better.      OBJECTIVE:     ROM PROM AROM  Comment     L R L R     Flexion    110         Abduction    100         ER    40 supine         IR                Not assessed d/t recent surgery   Strength L R Comment   Flexion         Abduction         ER         IR         Upper Trap         Lower Trap         Mid Trap         Biceps         Triceps            Not assessed d/t recent surgery  Special Tests Results/Comment   Margo Augustine     OBriens     Apprehension      Load & Shift            Reflexes/Sensation:               [x]? Dermatomes/Myotomes intact myotomes not assessed              []? Reflexes equal and normal bilaterally               []? Other:     Joint mobility: Not assessed d/t recent surgery              []? Normal               []? Hypo              []? Hyper     Palpation: generalized TTP over surgical sites     Functional Mobility/Transfers: decreased use of RUE d/t surgery. + ultrasling     Posture: WNL     Bandages/Dressings/Incisions:   12/30/20 post op dressings removed in clinic, incisions clean and dry. Small area of redness at base of open incision, will continue to monitor. 1/7/21 sutures removed in clinic, incisions clean and dry, healing well.        RESTRICTIONS/PRECAUTIONS: PROM x 6 weeks, strength at 8 weeks    Exercises/Interventions:   Exercises:  Exercise/Equipment Resistance/Repetitions Other comments   Stretching/PROM     Wand     Table Slides 10x10\" flex/ABD    UE [place]     Pulleys     Pendulum     Forward lean      Seated ER  With wand   Supine ER 10x10\" With wand, 0 ABD        Isometrics     Retraction      HEP    Weight shift     Flexion     Abduction     External Rotation     Internal Rotation     Biceps     Scapular clocks 12/6, 9/3 X 30 ea        PRE's     Flexion     Abduction     External Rotation     Internal Rotation     Shrugs x30    EXT     Reverse Flys     Serratus     Horizontal Abd with ER     Biceps x30 0#   Triceps     Retraction x30 Cable Column/Theraband     External Rotation     Internal Rotation     Shrugs     Lats     Ext     Flex     Scapular Retraction     BIC     TRIC     PNF          Dynamic Stability          Plyoback          Manual interventions      PROM  8'          Plan for next session: progress as tolerated within MD protocol    Therapeutic Exercise and NMR EXR  [x] (76901) Provided verbal/tactile cueing for activities related to strengthening, flexibility, endurance, ROM  for improvements in scapular, scapulothoracic and UE control with self care, reaching, carrying, lifting, house/yardwork, driving/computer work.    [] (67263) Provided verbal/tactile cueing for activities related to improving balance, coordination, kinesthetic sense, posture, motor skill, proprioception  to assist with  scapular, scapulothoracic and UE control with self care, reaching, carrying, lifting, house/yardwork, driving/computer work. Therapeutic Activities:    [] (52194 or 31629) Provided verbal/tactile cueing for activities related to improving balance, coordination, kinesthetic sense, posture, motor skill, proprioception and motor activation to allow for proper function of scapular, scapulothoracic and UE control with self care, carrying, lifting, driving/computer work. Home Exercise Program:   Access Code: [email or web address]   URL: [email or web address]   Date: 12/30/2020   Prepared by: Louise Cardona     Updated 1/14/21. Pt given printed handout.   Exercises   Seated Shoulder Flexion Towel Slide at Table Top - 10 reps - 1 sets - 10 hold - 3x daily - 7x weekly   Seated Shoulder Abduction Towel Slide at Table Top - 10 reps - 1 sets - 10 hold - 3x daily - 7x weekly   Seated Shoulder External Rotation AAROM with Dowel - 10 reps - 1 sets - 10 hold - 3x daily - 7x weekly   Seated Shoulder Shrugs - 10 reps - 3 sets - 3x daily - 7x weekly   Seated Scapular Retraction - 10 reps - 3 sets - 3x daily - 7x weekly Supported Elbow Flexion Extension PROM - 10 reps - 3 sets - 3x daily - 7x weekly   Seated Gripping Towel - 10 reps - 3 sets - 3x daily - 7x weekly       [x] (49784) Reviewed/Progressed HEP activities related to strengthening, flexibility, endurance, ROM of scapular, scapulothoracic and UE control with self care, reaching, carrying, lifting, house/yardwork, driving/computer work  [] (38922) Reviewed/Progressed HEP activities related to improving balance, coordination, kinesthetic sense, posture, motor skill, proprioception of scapular, scapulothoracic and UE control with self care, reaching, carrying, lifting, house/yardwork, driving/computer work      Manual Treatments:    [] (51755) Provided manual therapy to mobilize soft tissue/joints of cervical/CT, scapular GHJ and UE for the purpose of modulating pain, promoting relaxation,  increasing ROM, reducing/eliminating soft tissue swelling/inflammation/restriction, improving soft tissue extensibility and allowing for proper ROM for normal function with self care, reaching, carrying, lifting, house/yardwork, driving/computer work    Modalities: Will ice at home    Charges:  Timed Code Treatment Minutes: 40   Total Treatment Minutes: 40   Time in:   Time out:     [] EVAL (LOW) 84231 (typically 20 minutes face-to-face)  [] EVAL (MOD) 82445 (typically 30 minutes face-to-face)  [] EVAL (HIGH) 23936 (typically 45 minutes face-to-face)  [] RE-EVAL     [x] VZ(60186) x 2    [] IONTO  [] NMR (93079) x     [] VASO  [x] Manual (21887) x   1   [] Other:  [] TA x      [] Mech Traction (59652)  [] ES(attended) (66998)      [] ES (un) (44126):     GOALS:  Patient stated goal: get back to normal  []? Progressing: []? Met: []? Not Met: []? Adjusted     Therapist goals for Patient:   Short Term Goals: To be achieved in: 8 weeks 2/24/21  1. Independent in HEP and progression per patient tolerance, in order to prevent re-injury. []? Progressing: []? Met: []? Not Met: []?  Adjusted 2. Patient will have a decrease in pain to facilitate improvement in movement, function, and ADLs as indicated by Functional Deficits. []? Progressing: []? Met: []? Not Met: []? Adjusted   3. Patient will demonstrate PROM WFL. []? Progressing: []? Met: []? Not Met: []? Adjusted   4. Patient will tolerate initiation of strength program.    []? Progressing: []? Met: []? Not Met: []? Adjusted      Long Term Goals: To be achieved in: 24 weeks 6/16/21  1. Disability index score of 10% or less for the UEFS to assist with reaching prior level of function. []? Progressing: []? Met: []? Not Met: []? Adjusted  2. Patient will demonstrate increased AROM to WNL to allow for proper joint functioning as indicated by patients Functional Deficits. []? Progressing: []? Met: []? Not Met: []? Adjusted  3. Patient will demonstrate an increase in RUE strength to 4+/5 or greater to allow for proper functional mobility as indicated by patients Functional Deficits. []? Progressing: []? Met: []? Not Met: []? Adjusted  4. Patient will return to ADLs, IADLs and functional activities without increased symptoms or restriction. []? Progressing: []? Met: []? Not Met: []? Adjusted  5. Patient will tolerate progressive return to golf. []? Progressing: []? Met: []? Not Met: []? Adjusted    Overall Progression Towards Functional goals/ Treatment Progress Update:  [x] Patient is progressing as expected towards functional goals listed. [] Progression is slowed due to complexities/Impairments listed. [] Progression has been slowed due to co-morbidities.   [] Plan just implemented, too soon to assess goals progression <30days   [] Goals require adjustment due to lack of progress  [] Patient is not progressing as expected and requires additional follow up with physician  [] Other    Prognosis for POC: [x] Good [] Fair  [] Poor      Patient requires continued skilled intervention: [x] Yes  [] No    Treatment/Activity Tolerance: [x] Patient able to complete treatment  [] Patient limited by fatigue  [] Patient limited by pain     [] Patient limited by other medical complications  [] Other: Pt noted TTP through infraspinatus, tolerated soft tissue massage well, stated that it felt better afterwards. Pt demonstrated an increase in PROM during table slides in flexion; ABD slightly decreased compared to LPV. Pain noted with supine ER, stated that it felt similar to seated ER. No pain experienced with shrugs and seated scap retraction, did get some minimal pain in bicep with passive elbow flexion. Pt tended to activate bicep throughout treatment, emphasized surgical precautions to avoid active elbow flexion and to use other arm to bend R elbow. Progressions limited d/t surgical restrictions. Pt requires PT follow up to address ROM, strength and functional mobility deficits. PLAN: See eval  [x] Continue per plan of care [] Alter current plan (see comments above)  [] Plan of care initiated [] Hold pending MD visit [] Discharge      Electronically signed by:  Robbie Ross PTA, ATC        Note: If patient does not return for scheduled/ recommended follow up visits, this note will serve as a discharge from care along with most recent update on progress.

## 2021-02-04 ENCOUNTER — HOSPITAL ENCOUNTER (OUTPATIENT)
Dept: PHYSICAL THERAPY | Age: 66
Setting detail: THERAPIES SERIES
Discharge: HOME | End: 2021-02-04
Payer: COMMERCIAL

## 2021-02-04 PROCEDURE — 97140 MANUAL THERAPY 1/> REGIONS: CPT | Performed by: PHYSICAL THERAPIST

## 2021-02-04 PROCEDURE — 97110 THERAPEUTIC EXERCISES: CPT | Performed by: PHYSICAL THERAPIST

## 2021-02-04 NOTE — PROGRESS NOTES
[facility] [employer] [employer]  Orthopaedics and Sports Rehabilitation, [facility]    Physical Therapy Daily Treatment Note  Date:  2021    Patient Name:  Elinor Lorenzo    :  1955  MRN: [id number]  Restrictions/Precautions:    Medical/Treatment Diagnosis Information:  · Diagnosis: M75.121 (ICD-10-CM) - Nontraumatic complete tear of right rotator cuff  · s/p R Right shoulder arthroscopy with subacromial decompression, distal clavicle excision, rotator cuff repair, and open biceps tenodesis DOS 20  · Treatment Diagnosis: M25.511, O92.361, N18.0  Insurance/Certification information:  PT Insurance Information: PT BENEFITS  FAILITY/ UHC/ EFFECTIVE 20/ ACTIVE/ DED 3000 MET 1020/ PAYS 80%/ 20 VPCY HARD MAX/ ALLCODES BILLABLE/ 0 AUTH/ ACCESS DID NOT GET NAME/ REF# [id number]/ ACCESS CALLED FOR BENEFITS /20 PAG  Physician Information:  Referring Practitioner: Stella Mon. Pia Cuello MD  Has the plan of care been signed (Y/N):        []  Yes  [x]  No     Date of Patient follow up with Physician: 1 mo po      Is this a Progress Report:     [x]  Yes  []  No        If Yes:  Date Range for reporting period:  Beginnin20  Endin21    Progress report will be due (10 Rx or 30 days whichever is less): 76       Recertification will be due (POC Duration  / 90 days whichever is less): 3/30/21          Visit # Insurance Allowable Auth Required   5  (2 in ) 20 []  Yes []  No        Functional Scale:    Date assessed:  080=0% (100% deficit)   21  26/80=32.5% (67.5% deficit)   21     Latex Allergy:  [x]NO      []YES  Preferred Language for Healthcare:   [x]English       []other:    Pain level:  2/10     SUBJECTIVE:  5 weeks po. Pt feels that his shoulder is progressing well. He is still having some problems sleeping, wakes up 2-3 times a night due to discomfort.     OBJECTIVE:              ROM PROM AROM  Comment     L R L R     Flexion   110 table slide  142 manual       Abduction   95 table slide  125 manual         ER   70 supine      45 deg ABD   IR                Not assessed d/t recent surgery   Strength L R Comment   Flexion         Abduction         ER         IR         Upper Trap         Lower Trap         Mid Trap         Biceps         Triceps            Not assessed d/t recent surgery  Special Tests Results/Comment   Margo Augustine     OBriens     Apprehension      Load & Shift            Reflexes/Sensation:               [x]? Dermatomes/Myotomes intact myotomes not assessed              []? Reflexes equal and normal bilaterally               []? Other:     Joint mobility: Not assessed d/t recent surgery              []? Normal               []? Hypo              []? Hyper     Palpation: generalized TTP over surgical sites     Functional Mobility/Transfers: decreased use of RUE d/t surgery. + ultrasling     Posture: WNL     Bandages/Dressings/Incisions:   12/30/20 post op dressings removed in clinic, incisions clean and dry. Small area of redness at base of open incision, will continue to monitor. 1/7/21 sutures removed in clinic, incisions clean and dry, healing well.        RESTRICTIONS/PRECAUTIONS: PROM x 6 weeks, strength at 8 weeks    Exercises/Interventions:   Exercises:  Exercise/Equipment Resistance/Repetitions Other comments   Stretching/PROM     Wand     Table Slides 10x10\" flex/ABD    UE [place]     Pulleys     Pendulum     Forward lean      Seated ER  With wand   Supine ER 10x10\" With wand, 45 ABD   Wall slides 10x10\" Flex         Isometrics     Retraction      HEP    Weight shift     Flexion     Abduction     External Rotation     Internal Rotation     Biceps     Scapular clocks 12/6, 9/3 X 30 ea        PRE's     Flexion     Abduction     External Rotation     Internal Rotation     Shrugs x30    EXT     Reverse Flys     Serratus     Horizontal Abd with ER     Biceps x30 0#   Triceps     Retraction x30         Cable Column/Theraband External Rotation     Internal Rotation     Shrugs     Lats     Ext     Flex     Scapular Retraction     BIC     TRIC     PNF          Dynamic Stability          Plyoback          Manual interventions     Manual stretching 12' Flex/ABD           Plan for next session: progress as tolerated within MD protocol, bicep curls with weight, triceps, scap retraction     Therapeutic Exercise and NMR EXR  [x] (94938) Provided verbal/tactile cueing for activities related to strengthening, flexibility, endurance, ROM  for improvements in scapular, scapulothoracic and UE control with self care, reaching, carrying, lifting, house/yardwork, driving/computer work.    [] (51120) Provided verbal/tactile cueing for activities related to improving balance, coordination, kinesthetic sense, posture, motor skill, proprioception  to assist with  scapular, scapulothoracic and UE control with self care, reaching, carrying, lifting, house/yardwork, driving/computer work. Therapeutic Activities:    [] (63984 or 95819) Provided verbal/tactile cueing for activities related to improving balance, coordination, kinesthetic sense, posture, motor skill, proprioception and motor activation to allow for proper function of scapular, scapulothoracic and UE control with self care, carrying, lifting, driving/computer work. Home Exercise Program:   Access Code: [email or web address]   URL: [email or web address]   Date: 12/30/2020   Prepared by: Desmond Monk     Updated 1/14/21. Pt given printed handout.   Exercises   Seated Shoulder Flexion Towel Slide at Table Top - 10 reps - 1 sets - 10 hold - 3x daily - 7x weekly   Seated Shoulder Abduction Towel Slide at Table Top - 10 reps - 1 sets - 10 hold - 3x daily - 7x weekly   Seated Shoulder External Rotation AAROM with Dowel - 10 reps - 1 sets - 10 hold - 3x daily - 7x weekly   Seated Shoulder Shrugs - 10 reps - 3 sets - 3x daily - 7x weekly Seated Scapular Retraction - 10 reps - 3 sets - 3x daily - 7x weekly   Supported Elbow Flexion Extension PROM - 10 reps - 3 sets - 3x daily - 7x weekly   Seated Gripping Towel - 10 reps - 3 sets - 3x daily - 7x weekly       [x] (00895) Reviewed/Progressed HEP activities related to strengthening, flexibility, endurance, ROM of scapular, scapulothoracic and UE control with self care, reaching, carrying, lifting, house/yardwork, driving/computer work  [] (52956) Reviewed/Progressed HEP activities related to improving balance, coordination, kinesthetic sense, posture, motor skill, proprioception of scapular, scapulothoracic and UE control with self care, reaching, carrying, lifting, house/yardwork, driving/computer work      Manual Treatments:    [x] (74375) Provided manual therapy to mobilize soft tissue/joints of cervical/CT, scapular GHJ and UE for the purpose of modulating pain, promoting relaxation,  increasing ROM, reducing/eliminating soft tissue swelling/inflammation/restriction, improving soft tissue extensibility and allowing for proper ROM for normal function with self care, reaching, carrying, lifting, house/yardwork, driving/computer work    Modalities: Will ice at home    Charges:  Timed Code Treatment Minutes: 42   Total Treatment Minutes: 42   Time in: 9:30  Time out: 10:12    [] EVAL (LOW) 05412 (typically 20 minutes face-to-face)  [] EVAL (MOD) 86497 (typically 30 minutes face-to-face)  [] EVAL (HIGH) 70586 (typically 45 minutes face-to-face)  [] RE-EVAL     [x] OA(80746) x 2    [] IONTO  [] NMR (01609) x     [] VASO  [x] Manual (44415) x   1   [] Other:  [] TA x      [] Mech Traction (96735)  [] ES(attended) (71552)      [] ES (un) (64432):     GOALS:  Patient stated goal: get back to normal  []? Progressing: []? Met: []? Not Met: []? Adjusted     Therapist goals for Patient:   Short Term Goals:  To be achieved in: 8 weeks 2/24/21 1. Independent in HEP and progression per patient tolerance, in order to prevent re-injury. []? Progressing: []? Met: []? Not Met: []? Adjusted   2. Patient will have a decrease in pain to facilitate improvement in movement, function, and ADLs as indicated by Functional Deficits. []? Progressing: []? Met: []? Not Met: []? Adjusted   3. Patient will demonstrate PROM WFL. []? Progressing: []? Met: []? Not Met: []? Adjusted   4. Patient will tolerate initiation of strength program.    []? Progressing: []? Met: []? Not Met: []? Adjusted      Long Term Goals: To be achieved in: 24 weeks 6/16/21  1. Disability index score of 10% or less for the UEFS to assist with reaching prior level of function. []? Progressing: []? Met: []? Not Met: []? Adjusted  2. Patient will demonstrate increased AROM to WNL to allow for proper joint functioning as indicated by patients Functional Deficits. []? Progressing: []? Met: []? Not Met: []? Adjusted  3. Patient will demonstrate an increase in RUE strength to 4+/5 or greater to allow for proper functional mobility as indicated by patients Functional Deficits. []? Progressing: []? Met: []? Not Met: []? Adjusted  4. Patient will return to ADLs, IADLs and functional activities without increased symptoms or restriction. []? Progressing: []? Met: []? Not Met: []? Adjusted  5. Patient will tolerate progressive return to golf. []? Progressing: []? Met: []? Not Met: []? Adjusted    Overall Progression Towards Functional goals/ Treatment Progress Update:  [x] Patient is progressing as expected towards functional goals listed. [] Progression is slowed due to complexities/Impairments listed. [] Progression has been slowed due to co-morbidities.   [] Plan just implemented, too soon to assess goals progression <30days   [] Goals require adjustment due to lack of progress  [] Patient is not progressing as expected and requires additional follow up with physician  [] Other Prognosis for POC: [x] Good [] Fair  [] Poor      Patient requires continued skilled intervention: [x] Yes  [] No    Treatment/Activity Tolerance:  [x] Patient able to complete treatment  [] Patient limited by fatigue  [] Patient limited by pain     [] Patient limited by other medical complications  [] Other: Pt limited in ABD/flex ROM with table slides, significant increase in flex/ABD ROM with manual stretching compared to table slides. Pt tolerated supine ER stretch in 45 degrees abduction, demonstrated an increase in PROM compared to LPV, experienced some pain with stretch. Pt educated that slight pain in shoulder with stretching is okay, but the pain should not increase more than 1-2 grades on VAS. Pt felt UT working with scapular retractions, verbal and tactile cues to avoid using UT and focus on mid/lower trap, felt less tension in UT following cues. Pt tolerated addition of wall slides into flexion, pain at end range, stated that it became easier as he went. With wall slides, pt educated to assist flexion with L arm if experiencing pain getting to end range. Pt progression limited d/t post-op protocol. Pt requires PT follow up to address ROM, strength and functional mobility deficits. PLAN: See eval  [x] Continue per plan of care [] Alter current plan (see comments above)  [] Plan of care initiated [] Hold pending MD visit [] Discharge      Electronically signed by:  Magraux Wood, PT , DPT, OCS  Physical Therapist  [email or web address]  [email or web address]    Su Pisano, SPT  Therapist was present, directed the patient's care, made skilled judgement, and was responsible for assessment and treatment of the patient. Note: If patient does not return for scheduled/ recommended follow up visits, this note will serve as a discharge from care along with most recent update on progress.

## 2021-02-10 ENCOUNTER — HOSPITAL ENCOUNTER (OUTPATIENT)
Dept: PHYSICAL THERAPY | Age: 66
Setting detail: THERAPIES SERIES
Discharge: HOME | End: 2021-02-10
Payer: COMMERCIAL

## 2021-02-10 ENCOUNTER — OFFICE VISIT (OUTPATIENT)
Dept: ORTHOPEDIC SURGERY | Age: 66
End: 2021-02-10

## 2021-02-10 VITALS — TEMPERATURE: 96.9 F | BODY MASS INDEX: 25.47 KG/M2 | HEIGHT: 72 IN | WEIGHT: 188 LBS

## 2021-02-10 DIAGNOSIS — M19.011 ARTHRITIS OF RIGHT ACROMIOCLAVICULAR JOINT: ICD-10-CM

## 2021-02-10 DIAGNOSIS — M75.21 BICEPS TENDINITIS OF RIGHT SHOULDER: ICD-10-CM

## 2021-02-10 DIAGNOSIS — M75.121 NONTRAUMATIC COMPLETE TEAR OF RIGHT ROTATOR CUFF: Primary | ICD-10-CM

## 2021-02-10 PROCEDURE — 97110 THERAPEUTIC EXERCISES: CPT | Performed by: PHYSICAL THERAPIST

## 2021-02-10 PROCEDURE — 99024 POSTOP FOLLOW-UP VISIT: CPT | Performed by: ORTHOPAEDIC SURGERY

## 2021-02-10 NOTE — PROGRESS NOTES
Patient returns today about 5 weeks status post right shoulder subacromial decompression, distal clavicle excision, biceps tenodesis, and rotator cuff repair. He is doing very well. He says he has no pain for most of the day. ROS: Pertinent items are noted in HPI. No notes on file    Past Medical History:  No date: Crohn disease ([address]  No date: Hyperlipidemia     Past Surgical History:  No date: COLONOSCOPY  12/29/2020: SHOULDER ARTHROSCOPY; Right      Comment:  RIGHT SHOULDER ARTHROSCOPY, SUBACROMIAL DECOMPRESSION,                DISTAL CLAVICLE EXCISION,ROTATOR CUFF REPAIR AND OPEN                BICEPS TENODESIS  RIGHT [id number], [id number], [id number],                [id number]) - RIGHT INTERSCALENE BLOCK performed by Héctor Mandujano MD at [address] [place] [address]  No date: SMALL INTESTINE SURGERY    History reviewed. No pertinent family history.       Social History    Socioeconomic History      Marital status:       Spouse name: None      Number of children: None      Years of education: None      Highest education level: None    Occupational History      None    Social Needs      Financial resource strain: None      Food insecurity        Worry: None        Inability: None      Transportation needs        Medical: None        Non-medical: None    Tobacco Use      Smoking status: Never Smoker      Smokeless tobacco: Never Used    Substance and Sexual Activity      Alcohol use: Yes        Comment: 3-5/ WEEK      Drug use: Never      Sexual activity: None    Lifestyle      Physical activity        Days per week: None        Minutes per session: None      Stress: None    Relationships      Social connections        Talks on phone: None        Gets together: None        Attends Hinduism service: None        Active member of club or organization: None        Attends meetings of clubs or organizations: None        Relationship status: None      Intimate partner violence Fear of current or ex partner: None        Emotionally abused: None        Physically abused: None        Forced sexual activity: None    Other Topics      Concerns:        None    Social History Narrative      None      Current Outpatient Medications:    Multiple Vitamins-Minerals (PRESERVISION AREDS PO), Take by mouth 2 times daily, Disp: , Rfl:     Multiple Vitamins-Minerals (CENTRUM SILVER) TABS, Take 1 tablet by mouth daily , Disp: , Rfl:     VASCEPA 1 g CAPS capsule, TAKE 2 CAPSULES BY MOUTH TWICE A DAY, Disp: , Rfl: 9    No current facility-administered medications for this visit. No Known Allergies    VITAL SIGNS:  Temp 96.9 °F (36.1 °C)   Ht 6' (1.829 m)   Wt 188 lb (85.3 kg)   BMI 25.50 kg/m²   On examination today incision for the biceps tenodesis and his portals are all clean dry with no erythema or induration. He really has no palpable tenderness about the shoulder. His range of motion is quite good. Passively he has 160 degrees of flexion. At 90 degrees abduction he has 60 degrees of external rotation and 30 degrees of internal rotation. Impression doing very well 5 weeks status post right rotator cuff repair. Plan: He just again was warned not to do active range of motion with the shoulder and to be careful not to slip or fall. We will see him back in a month.

## 2021-02-10 NOTE — FLOWSHEET NOTE
[facility]  Orthopaedics and Sports Rehabilitation, [facility]    Physical Therapy Daily Treatment Note  Date:  2/10/2021    Patient Name:  Felipe Leblanc    :  1955  MRN: [id number]  Restrictions/Precautions:    Medical/Treatment Diagnosis Information:  · Diagnosis: M75.121 (ICD-10-CM) - Nontraumatic complete tear of right rotator cuff  · s/p R Right shoulder arthroscopy with subacromial decompression, distal clavicle excision, rotator cuff repair, and open biceps tenodesis DOS 20  · Treatment Diagnosis: M25.511, R25.057, E03.9  Insurance/Certification information:  PT Insurance Information: PT BENEFITS  FAILITY/ [employer]/ EFFECTIVE 20/ ACTIVE/ DED 3000 MET 1020/ PAYS 80%/ 20 VPCY HARD MAX/ ALLCODES BILLABLE/ 0 AUTH/ ACCESS DID NOT GET NAME/ REF# [id number]/ ACCESS CALLED FOR BENEFITS /20 PAG  Physician Information:  Referring Practitioner: Shahzad Monson.  Marko Vanessa MD  Has the plan of care been signed (Y/N):        []  Yes  [x]  No     Date of Patient follow up with Physician: 1 mo po      Is this a Progress Report:     []  Yes  [x]  No        If Yes:  Date Range for reporting period:  Beginning:   Ending:    Progress report will be due (10 Rx or 30 days whichever is less): 93       Recertification will be due (POC Duration  / 90 days whichever is less): 3/30/21          Visit # Insurance Allowable Auth Required   6  (2 in ) 20 []  Yes []  No        Functional Scale:    Date assessed:  0/80=0% (100% deficit)   21  26/80=32.5% (67.5% deficit)   21     Latex Allergy:  [x]NO      []YES  Preferred Language for Healthcare:   [x]English       []other:    Pain level:  Minimal pain SUBJECTIVE:  6 weeks po. Visit with doctor went well, said that he is progressing well. Pt is having difficulty with the wall slides at home, has to help his arm up and find a position that isn't painful in order to get a good stretch. He is no longer achieving much of a stretch with table slides. He does experience occasional popping in the shoulder with certain movements. OBJECTIVE:              ROM PROM AROM  Comment     L R L R     Flexion   112 table slide  151 wand         Abduction   120 table slide  150 Wall slide         ER   80 supine      60 deg ABD   IR                Not assessed d/t recent surgery   Strength L R Comment   Flexion         Abduction         ER         IR         Upper Trap         Lower Trap         Mid Trap         Biceps         Triceps            Not assessed d/t recent surgery  Special Tests Results/Comment   Margo Tanner     Speeds     OBriens     Apprehension      Load & Shift            Reflexes/Sensation:               [x]? Dermatomes/Myotomes intact myotomes not assessed              []? Reflexes equal and normal bilaterally               []? Other:     Joint mobility: Not assessed d/t recent surgery              []? Normal               []? Hypo              []? Hyper     Palpation: generalized TTP over surgical sites     Functional Mobility/Transfers: decreased use of RUE d/t surgery. + ultrasling     Posture: WNL     Bandages/Dressings/Incisions:   12/30/20 post op dressings removed in clinic, incisions clean and dry. Small area of redness at base of open incision, will continue to monitor. 1/7/21 sutures removed in clinic, incisions clean and dry, healing well.        RESTRICTIONS/PRECAUTIONS: PROM x 6 weeks, strength at 10 weeks    Exercises/Interventions:   Exercises:  Exercise/Equipment Resistance/Repetitions Other comments   Stretching/PROM     Wand 3x30 OH flexion    Table Slides 5x10\" flex/ABD    UE [place]     Pulleys     Pendulum     Forward lean Seated ER  With wand   Supine ER 10x10\" With wand, 60 ABD   Wall slides 10x10\" Flex/ABD         Isometrics     Retraction      HEP    Weight shift     Flexion     Abduction     External Rotation     Internal Rotation     Biceps     Scapular clocks  X 30 ea        PRE's     Flexion 3x10 Supine   Abduction     External Rotation     Internal Rotation     Shrugs     EXT     Reverse Flys     Serratus     Horizontal Abd with ER     Biceps x30 1#    Triceps     Retraction 10x10\" prone    Prone rows 3x10              Cable Column/Theraband     External Rotation     Internal Rotation     Shrugs     Lats     Ext     Flex     Scapular Retraction     BIC     TRIC 3x10 blue    PNF          Dynamic Stability          [place]          Manual interventions     Manual stretching     STM 2' proximal biceps      Plan for next session: progress as tolerated within MD protocol    Therapeutic Exercise and NMR EXR  [x] (76107) Provided verbal/tactile cueing for activities related to strengthening, flexibility, endurance, ROM  for improvements in scapular, scapulothoracic and UE control with self care, reaching, carrying, lifting, house/yardwork, driving/computer work.    [] (67348) Provided verbal/tactile cueing for activities related to improving balance, coordination, kinesthetic sense, posture, motor skill, proprioception  to assist with  scapular, scapulothoracic and UE control with self care, reaching, carrying, lifting, house/yardwork, driving/computer work. Therapeutic Activities:    [] (72749 or 31308) Provided verbal/tactile cueing for activities related to improving balance, coordination, kinesthetic sense, posture, motor skill, proprioception and motor activation to allow for proper function of scapular, scapulothoracic and UE control with self care, carrying, lifting, driving/computer work. Home Exercise Program:   Access Code: [email or web address]   URL: [email or web address]   Date: 12/30/2020 Prepared by: Catalino Jaquez     Updated 1/14/21. Pt given printed handout. Updated 2/10/21. Printed handout provided.     Exercises   Seated Shoulder Flexion Towel Slide at Table Top - 10 reps - 1 sets - 10 hold - 3x daily - 7x weekly   Seated Shoulder Abduction Towel Slide at Table Top - 10 reps - 1 sets - 10 hold - 3x daily - 7x weekly   Seated Shoulder External Rotation AAROM with Dowel - 10 reps - 1 sets - 10 hold - 3x daily - 7x weekly   Seated Shoulder Shrugs - 10 reps - 3 sets - 3x daily - 7x weekly   Seated Scapular Retraction - 10 reps - 3 sets - 3x daily - 7x weekly   Seated Gripping Towel - 10 reps - 3 sets - 3x daily - 7x weekly   Supine Alternating Shoulder Flexion - 10 reps - 3 sets - 3x daily - 7x weekly   Prone Scapular Retraction - 10 reps - 10 seconds hold - 3x daily - 7x weekly   Prone Scapular Retraction and Row - 10 reps - 3 sets - 3x daily - 7x weekly   Supine Shoulder Flexion Extension AAROM with Dowel - 10 reps - 10 seconds hold - 3x daily - 7x weekly   Standing Single Arm Bicep Curls Supinated with Dumbbell - 10 reps - 3 sets - 3x daily - 7x weekly   Shoulder Flexion Wall Slide with Towel - 10 reps - 10 seconds hold - 3x daily - 7x weekly   Standing Shoulder Abduction Slides at Wall - 10 reps - 10 seconds hold - 3x daily - 7x weekly       [x] (74985) Reviewed/Progressed HEP activities related to strengthening, flexibility, endurance, ROM of scapular, scapulothoracic and UE control with self care, reaching, carrying, lifting, house/yardwork, driving/computer work  [] (68358) Reviewed/Progressed HEP activities related to improving balance, coordination, kinesthetic sense, posture, motor skill, proprioception of scapular, scapulothoracic and UE control with self care, reaching, carrying, lifting, house/yardwork, driving/computer work      Manual Treatments: 2. Patient will demonstrate increased AROM to WNL to allow for proper joint functioning as indicated by patients Functional Deficits. []? Progressing: []? Met: []? Not Met: []? Adjusted  3. Patient will demonstrate an increase in RUE strength to 4+/5 or greater to allow for proper functional mobility as indicated by patients Functional Deficits. []? Progressing: []? Met: []? Not Met: []? Adjusted  4. Patient will return to ADLs, IADLs and functional activities without increased symptoms or restriction. []? Progressing: []? Met: []? Not Met: []? Adjusted  5. Patient will tolerate progressive return to golf. []? Progressing: []? Met: []? Not Met: []? Adjusted    Overall Progression Towards Functional goals/ Treatment Progress Update:  [x] Patient is progressing as expected towards functional goals listed. [] Progression is slowed due to complexities/Impairments listed. [] Progression has been slowed due to co-morbidities.   [] Plan just implemented, too soon to assess goals progression <30days   [] Goals require adjustment due to lack of progress  [] Patient is not progressing as expected and requires additional follow up with physician  [] Other    Prognosis for POC: [x] Good [] Fair  [] Poor      Patient requires continued skilled intervention: [x] Yes  [] No    Treatment/Activity Tolerance:  [x] Patient able to complete treatment  [] Patient limited by fatigue  [] Patient limited by pain     [] Patient limited by other medical complications [] Other: Pt demonstrated an increase in PROM into flexion/ABD/ER compared to LPV. Pt tolerated addition of prone scap retraction, verbal cues to relax neck during exercise. Pt able to appropriately contract mid/lower trap without UT compensation during prone rows; pt felt minimal muscle fatigue with exercise. Pt tolerated addition of supine shoulder flexion to 90 degrees, felt slight pain in proximal biceps with exercise. Muscle tightness felt throughout proximal biceps during manual palpation, pt tolerated STM well, stated that supine shoulder flexion felt better afterwards. Pt tolerated addition of OH stretch with wand, was able to achieve a better stretch compared to table slides. During wall slides into flexion, pt needed assistance from L arm at ~120 degrees due to pain; minimal pain and effective stretch at end range. Pt experienced less difficulty with wall slides into ABD, was able to achieve more ROM compared to table slides; significant stretch and minimal pain noted. No problems with addition of 1# weight to bicep curls. Pt requires PT follow up to address ROM, strength and functional mobility deficits. PLAN: See eval  [x] Continue per plan of care [] Alter current plan (see comments above)  [] Plan of care initiated [] Hold pending MD visit [] Discharge      Electronically signed by:  Ghazal Cintron, PT , DPT, OCS  Physical Therapist  [id number]  [email or web address]    Catracho Sutherland, [facility]  Therapist was present, directed the patient's care, made skilled judgement, and was responsible for assessment and treatment of the patient. Note: If patient does not return for scheduled/ recommended follow up visits, this note will serve as a discharge from care along with most recent update on progress.

## 2021-02-11 ENCOUNTER — APPOINTMENT (OUTPATIENT)
Dept: PHYSICAL THERAPY | Age: 66
End: 2021-02-11
Payer: COMMERCIAL

## 2021-03-10 ENCOUNTER — OFFICE VISIT (OUTPATIENT)
Dept: ORTHOPEDIC SURGERY | Age: 66
End: 2021-03-10

## 2021-03-10 VITALS — BODY MASS INDEX: 25.5 KG/M2 | TEMPERATURE: 97.5 F | WEIGHT: 188 LBS

## 2021-03-10 DIAGNOSIS — Z98.890 STATUS POST RIGHT ROTATOR CUFF REPAIR: Primary | ICD-10-CM

## 2021-03-10 PROCEDURE — 99024 POSTOP FOLLOW-UP VISIT: CPT | Performed by: ORTHOPAEDIC SURGERY

## 2021-03-10 NOTE — PROGRESS NOTES
[facility]  Office Visit  Follow up  Date:  3/10/2021    Name:  Jorge Talavera  Address:  [address] [place] [address]    :  1955      Age:   63 y.o.    SSN:  [id number]      Medical Record Number:  [id number]    Chief Complaint:    Status post right arthroscopic rotator cuff repair on 2020    HPI:   Jorge Talavera is a 63 y.o. male who is following up during the above procedure. Patient states he is in physical therapy and doing well. He has occasional pain but overall feels his pain is diminishing. He denies any other questions or concerns today. Overall doing well. He denies any new numbness, tingling, fevers, chills, chest pain, shortness of breath, or any other new significant symptoms. Past History:  Past Medical History:   Diagnosis Date    [address] ([facility] [address]     Hyperlipidemia        Past Surgical History:   Procedure Laterality Date    COLONOSCOPY      SHOULDER ARTHROSCOPY Right 2020    RIGHT SHOULDER ARTHROSCOPY, SUBACROMIAL DECOMPRESSION, DISTAL CLAVICLE EXCISION,ROTATOR CUFF REPAIR AND OPEN BICEPS TENODESIS  RIGHT [id number]([id number], [id number], [id number], [id number]) - RIGHT INTERSCALENE BLOCK performed by Ariane Reyes MD at [place] [address]         Social History     Tobacco Use    Smoking status: Never Smoker    Smokeless tobacco: Never Used   Substance Use Topics    Alcohol use: Yes     Comment: 3-5/ WEEK    Drug use: Never        Family History:  family history is not on file.          Current Outpatient Medications:     Multiple Vitamins-Minerals (PRESERVISION AREDS PO), Take by mouth 2 times daily, Disp: , Rfl:     Multiple Vitamins-Minerals (CENTRUM SILVER) TABS, Take 1 tablet by mouth daily , Disp: , Rfl:     VASCEPA 1 g CAPS capsule, TAKE 2 CAPSULES BY MOUTH TWICE A DAY, Disp: , Rfl: 9      No Known Allergies      Review of Systems: A 14 point review of systems available in the scanned medical record as documented by the patient on 3/10/21. Today's review pertinent items are noted in HPI. No notes on file    Physical Exam:  Temp 97.5 °F (36.4 °C)   Wt 188 lb (85.3 kg)   BMI 25.50 kg/m²     General: No acute distress, well nourished  CV: No obvious peripheral edema. Normal peripheral pulses  Neuro: Alert & oriented x 3  Psych: Normal mood and affect        Right shoulder exam    Inspection:  No gross deformities, periscapular musculature is symmetry without atrophy, no obvious winging    Palpation: No significant tenderness overlying the rotator cuff footprint, bicipital groove, AC joint, or periscapular region    Active/Passive ROM: Forward flexion150, abduction170, internal rotation to L4, external rotation 50 degrees with the arm at side    Strength: 4+/5 strength with external rotation, shoulder abduction    Neurovascular: Neurovascularly intact    Special Tests: No crepitus with gentle range of motion      Radiographic:  No new imaging today    Assessment:  Connie Lennox is a 63 y.o. male with:  1. Status post right rotator cuff repair on 12/29/2021    Impression:  No diagnosis found. Office Procedures:  No orders of the defined types were placed in this encounter. Plan:   Pertinent imaging was reviewed. The etiology, natural history, and treatment options for the disorder were discussed. The roles of activity modifications, medications, injections, physical therapy, and surgical interventions were all described to the patient and questions were answered. Discussed with patient that he is doing very well at this time. Would like him to start working on rotator cuff strengthening with physical therapy in 2 weeks.   We will see him back in 6 weeks time for repeat clinical exam.    [address] Fellow  Attestation:  I was physically present and performed my own examination of this patient and have discussed the case, including pertinent history and exam findings with the fellow. I agree with the documented assessment and plan. David Kwok. Boogie Will MD    The encounter with Jose Shin was supervised by Dr. Boogie Will who personally examined the patient and reviewed the plan. This dictation was performed with a verbal recognition program (DRAGON) and it was checked for errors. It is possible that there are still dictated errors within this office note. If so, please bring any errors to my attention for an addendum. All efforts were made to ensure that this office note is accurate.

## 2021-04-21 ENCOUNTER — OFFICE VISIT (OUTPATIENT)
Dept: ORTHOPEDIC SURGERY | Age: 66
End: 2021-04-21
Payer: COMMERCIAL

## 2021-04-21 VITALS — WEIGHT: 188 LBS | BODY MASS INDEX: 25.47 KG/M2 | TEMPERATURE: 98.4 F | HEIGHT: 72 IN

## 2021-04-21 DIAGNOSIS — Z98.890 STATUS POST RIGHT ROTATOR CUFF REPAIR: Primary | ICD-10-CM

## 2021-04-21 PROCEDURE — G8427 DOCREV CUR MEDS BY ELIG CLIN: HCPCS | Performed by: ORTHOPAEDIC SURGERY

## 2021-04-21 PROCEDURE — 3017F COLORECTAL CA SCREEN DOC REV: CPT | Performed by: ORTHOPAEDIC SURGERY

## 2021-04-21 PROCEDURE — 1036F TOBACCO NON-USER: CPT | Performed by: ORTHOPAEDIC SURGERY

## 2021-04-21 PROCEDURE — G8419 CALC BMI OUT NRM PARAM NOF/U: HCPCS | Performed by: ORTHOPAEDIC SURGERY

## 2021-04-21 PROCEDURE — 99212 OFFICE O/P EST SF 10 MIN: CPT | Performed by: ORTHOPAEDIC SURGERY

## 2021-04-21 NOTE — PROGRESS NOTES
Patient returns today about 4 months status post right rotator cuff repair. Feels he is doing very well. ROS: Pertinent items are noted in HPI. No notes on file    Past Medical History:  No date: Crohn disease ([address]  No date: Hyperlipidemia     Past Surgical History:  No date: COLONOSCOPY  12/29/2020: SHOULDER ARTHROSCOPY; Right      Comment:  RIGHT SHOULDER ARTHROSCOPY, SUBACROMIAL DECOMPRESSION,                DISTAL CLAVICLE EXCISION,ROTATOR CUFF REPAIR AND OPEN                BICEPS TENODESIS  RIGHT [id number](51089, [id number], [id number],                86704) - RIGHT INTERSCALENE BLOCK performed by Anastasiya Gama MD at [facility]  No date: SMALL INTESTINE SURGERY    History reviewed. No pertinent family history.       Social History    Socioeconomic History      Marital status:       Spouse name: None      Number of children: None      Years of education: None      Highest education level: None    Occupational History      None    Social Needs      Financial resource strain: None      Food insecurity        Worry: None        Inability: None      Transportation needs        Medical: None        Non-medical: None    Tobacco Use      Smoking status: Never Smoker      Smokeless tobacco: Never Used    Substance and Sexual Activity      Alcohol use: Yes        Comment: 3-5/ WEEK      Drug use: Never      Sexual activity: None    Lifestyle      Physical activity        Days per week: None        Minutes per session: None      Stress: None    Relationships      Social connections        Talks on phone: None        Gets together: None        Attends Yazdanism service: None        Active member of club or organization: None        Attends meetings of clubs or organizations: None        Relationship status: None      Intimate partner violence        Fear of current or ex partner: None        Emotionally abused: None        Physically abused: None        Forced sexual activity: None    Other Topics      Concerns:        None    Social History Narrative      None      Current Outpatient Medications:  Multiple Vitamins-Minerals (PRESERVISION AREDS PO), Take by mouth 2 times daily, Disp: , Rfl:   Multiple Vitamins-Minerals (CENTRUM SILVER) TABS, Take 1 tablet by mouth daily , Disp: , Rfl:   VASCEPA 1 g CAPS capsule, TAKE 2 CAPSULES BY MOUTH TWICE A DAY, Disp: , Rfl: 9    No current facility-administered medications for this visit. No Known Allergies    VITAL SIGNS:  Temp 98.4 °F (36.9 °C)   Ht 6' (1.829 m)   Wt 188 lb (85.3 kg)   BMI 25.50 kg/m²   On examination today his incisions and portals are all well-healed. He is 108 degrees active flexion and abduction with normal scapular rhythm. He has a some very slight bit of anterior cuff tenderness but this matches his opposite side. With resisted external rotation and contract his infraspinatus nicely. Impression doing very well 4 months status post right rotator cuff repair distal clavicle excision. Valentín: Continue physical therapy. We told him still to limit his forceful activities especially at or above shoulder level. We will see him back in 2 months.

## (undated) DEVICE — NEEDLE SUT PASS FOR ROT CUF LABRAL REP SUREFIRE SCORPION

## (undated) DEVICE — DYONICS 25 PATIENT TUBE SET MUST                                    BE USED WITH 7211007, 12 PER BOX

## (undated) DEVICE — 6 ML SYRINGE LUER-LOCK TIP: Brand: MONOJECT

## (undated) DEVICE — INCISOR PLUS PLATINUM 4.5 MM BLADE: Brand: DYONICS INCISOR

## (undated) DEVICE — LIGHT HANDLE: Brand: DEVON

## (undated) DEVICE — PACK PROCEDURE SURG SHLDR MFFOP CUST

## (undated) DEVICE — SHOULDER STABILIZATION KIT,                                    DISPOSABLE 12 PER BOX

## (undated) DEVICE — SUTURE VCRL SZ 2-0 L36IN ABSRB UD L36MM CT-1 1/2 CIR J945H

## (undated) DEVICE — WEREWOLF FLOW 90 COBLATION WAND: Brand: COBLATION

## (undated) DEVICE — 3M™ STERI-DRAPE™ U-DRAPE 1067 1067 5/BX 4BX/CS/CTN&#X20;: Brand: STERI-DRAPE™

## (undated) DEVICE — CANNULA ARTHSCP L3CM ID8MM DBL DAM 1 PC MOLD LO PROF FLNG

## (undated) DEVICE — BOWL MED L 32OZ PLAS W/ MOLD GRAD EZ OPN PEEL PCH

## (undated) DEVICE — STRIP,CLOSURE,WOUND,MEDI-STRIP,1/2X4: Brand: MEDLINE

## (undated) DEVICE — 3M™ IOBAN™ 2 ANTIMICROBIAL INCISE DRAPE 6650EZ: Brand: IOBAN™ 2

## (undated) DEVICE — INTENDED FOR TISSUE SEPARATION, AND OTHER PROCEDURES THAT REQUIRE A SHARP SURGICAL BLADE TO PUNCTURE OR CUT.: Brand: BARD-PARKER ® STAINLESS STEEL BLADES

## (undated) DEVICE — DYONICS 25 DAY TUBE SET MUST BE                                    USED WITH 7211008, 3 PER BOX

## (undated) DEVICE — SHEET,DRAPE,53X77,STERILE: Brand: MEDLINE

## (undated) DEVICE — CHLORAPREP 26ML ORANGE

## (undated) DEVICE — GOWN,PREVENTION PLUS,XLN/XL,ST,24/CS: Brand: MEDLINE

## (undated) DEVICE — DRAPE,U/ SHT,SPLIT,PLAS,STERIL: Brand: MEDLINE

## (undated) DEVICE — Device

## (undated) DEVICE — SLING ARM L ABV 13IN DE-ROTATION STRP HOOKS PROVIDE IMMOB

## (undated) DEVICE — GLOVE,SURG,SENSICARE SLT,LF,PF,8: Brand: MEDLINE

## (undated) DEVICE — CANNULA ARTHSCP L7CM DIA7MM TRNSLUC THRD FLX W/ NO SQUIRT

## (undated) DEVICE — MAT FLR ABS DISP

## (undated) DEVICE — GLOVE ORANGE PI 8   MSG9080

## (undated) DEVICE — 5.5 MM ELITE ACROMIOBLASTER                                    STRAIGHT DISPOSABLE BURRS, BRICK                                    RED, 10000 MAXIMUM RPM, PACKAGED 6                                    PER BOX, STERILE

## (undated) DEVICE — 1010 S-DRAPE TOWEL DRAPE 10/BX: Brand: STERI-DRAPE™

## (undated) DEVICE — MEDI-VAC NON-CONDUCTIVE SUCTION TUBING: Brand: CARDINAL HEALTH

## (undated) DEVICE — PENCIL ES L3M BTTN SWCH S STL HEX LOK BLDE ELECTRD HOLSTER

## (undated) DEVICE — SUTURE MCRYL SZ 4-0 L27IN ABSRB UD L19MM PS-2 1/2 CIR PRIM Y426H

## (undated) DEVICE — T-MAX DISPOSABLE FACE MASK 8 PER BOX

## (undated) DEVICE — HYPODERMIC SAFETY NEEDLE: Brand: MAGELLAN

## (undated) DEVICE — SUTURE ETHLN SZ 2-0 L30IN NONABSORBABLE BLK L36MM FSLX 3/8 1674H

## (undated) DEVICE — SUTURE ETHLN SZ 4-0 L18IN NONABSORBABLE BLK L19MM PS-2 3/8 1667H

## (undated) DEVICE — GAUZE,SPONGE,4"X4",8PLY,STRL,LF,10/TRAY: Brand: MEDLINE

## (undated) DEVICE — KIT INSTR W/ 2.4MM GUIDEPIN SUT PASS WIRE NO2 FIBERWIRE

## (undated) DEVICE — BLANKET WRM W40.2XL55.9IN IORT LO BODY + MISTRAL AIR